# Patient Record
Sex: MALE | Race: WHITE | HISPANIC OR LATINO | Employment: FULL TIME | URBAN - METROPOLITAN AREA
[De-identification: names, ages, dates, MRNs, and addresses within clinical notes are randomized per-mention and may not be internally consistent; named-entity substitution may affect disease eponyms.]

---

## 2018-12-23 ENCOUNTER — OFFICE VISIT (OUTPATIENT)
Dept: URGENT CARE | Facility: CLINIC | Age: 33
End: 2018-12-23
Payer: COMMERCIAL

## 2018-12-23 VITALS
RESPIRATION RATE: 18 BRPM | SYSTOLIC BLOOD PRESSURE: 122 MMHG | TEMPERATURE: 98.7 F | HEART RATE: 93 BPM | DIASTOLIC BLOOD PRESSURE: 84 MMHG | OXYGEN SATURATION: 97 %

## 2018-12-23 DIAGNOSIS — J20.9 ACUTE BRONCHITIS, UNSPECIFIED ORGANISM: Primary | ICD-10-CM

## 2018-12-23 PROCEDURE — 99213 OFFICE O/P EST LOW 20 MIN: CPT | Performed by: PHYSICIAN ASSISTANT

## 2018-12-23 RX ORDER — BENZONATATE 100 MG/1
100 CAPSULE ORAL 3 TIMES DAILY PRN
Qty: 30 CAPSULE | Refills: 0 | Status: SHIPPED | OUTPATIENT
Start: 2018-12-23

## 2018-12-23 RX ORDER — ALBUTEROL SULFATE 90 UG/1
2 AEROSOL, METERED RESPIRATORY (INHALATION) EVERY 6 HOURS PRN
Qty: 1 INHALER | Refills: 0 | Status: SHIPPED | OUTPATIENT
Start: 2018-12-23

## 2018-12-23 RX ORDER — FLUTICASONE PROPIONATE 50 MCG
1 SPRAY, SUSPENSION (ML) NASAL DAILY
Qty: 1 BOTTLE | Refills: 0 | Status: SHIPPED | OUTPATIENT
Start: 2018-12-23

## 2018-12-23 NOTE — PATIENT INSTRUCTIONS
Acute Bronchitis   AMBULATORY CARE:   Acute bronchitis  is swelling and irritation in the air passages of your lungs  This irritation may cause you to cough or have other breathing problems  Acute bronchitis often starts because of another illness, such as a cold or the flu  The illness spreads from your nose and throat to your windpipe and airways  Bronchitis is often called a chest cold  Acute bronchitis lasts about 3 to 6 weeks and is usually not a serious illness  Your cough can last for several weeks  You may have any of the following symptoms:   · A cough with sputum that may be clear, yellow, or green    · Feeling more tired than usual, and body aches    · A fever and chills    · Wheezing when you breathe    · A tight chest or pain when you breathe or cough  Seek care immediately if:   · You cough up blood  · Your lips or fingernails turn blue  · You feel like you are not getting enough air when you breathe  Contact your healthcare provider if:   · You have a fever  · Your breathing problems do not go away or get worse  · Your cough does not get better within 4 weeks  · You have questions or concerns about your condition or care  Self-care:   · Get more rest   Rest helps your body to heal  Slowly start to do more each day  Rest when you feel it is needed  · Avoid irritants in the air  Avoid chemicals, fumes, and dust  Wear a face mask if you must work around dust or fumes  Stay inside on days when air pollution levels are high  If you have allergies, stay inside when pollen counts are high  Do not use aerosol products, such as spray-on deodorant, bug spray, and hair spray  · Do not smoke or be around others who smoke  Nicotine and other chemicals in cigarettes and cigars damages the cilia that move mucus out of your lungs  Ask your healthcare provider for information if you currently smoke and need help to quit  E-cigarettes or smokeless tobacco still contain nicotine   Talk to your healthcare provider before you use these products  · Drink liquids as directed  Liquids help keep your air passages moist and help you cough up mucus  You may need to drink more liquids when you have acute bronchitis  Ask how much liquid to drink each day and which liquids are best for you  · Use a humidifier or vaporizer  Use a cool mist humidifier or a vaporizer to increase air moisture in your home  This may make it easier for you to breathe and help decrease your cough  Prevent acute bronchitis by doing the following:   · Get the vaccinations you need  Ask your healthcare provider if you should get vaccinated against the flu or pneumonia  · Prevent the spread of germs  You can decrease your risk of acute bronchitis and other illnesses by doing the following:     [facility] [place] your hands often with soap and water  Carry germ-killing hand lotion or gel with you  You can use the lotion or gel to clean your hands when soap and water are not available  ¨ Do not touch your eyes, nose, or mouth unless you have washed your hands first     ¨ Always cover your mouth when you cough to prevent the spread of germs  It is best to cough into a tissue or your shirt sleeve instead of into your hand  Ask those around you cover their mouths when they cough  ¨ Try to avoid people who have a cold or the flu  If you are sick, stay away from others as much as possible  Medicines: Your healthcare provider may  give you any of the following:  · Ibuprofen or acetaminophen  are medicines that help lower your fever  They are available without a doctor's order  Ask your healthcare provider which medicine is right for you  Ask how much to take and how often to take it  Follow directions  These medicines can cause stomach bleeding if not taken correctly  Ibuprofen can cause kidney damage  Do not take ibuprofen if you have kidney disease, an ulcer, or allergies to aspirin  Acetaminophen can cause liver damage   Do not take more than 4,000 milligrams in 24 hours  · Decongestants  help loosen mucus in your lungs and make it easier to cough up  This can help you breathe easier  · Cough suppressants  decrease your urge to cough  If your cough produces mucus, do not take a cough suppressant unless your healthcare provider tells you to  Your healthcare provider may suggest that you take a cough suppressant at night so you can rest     · Inhalers  may be given  Your healthcare provider may give you one or more inhalers to help you breathe easier and cough less  An inhaler gives your medicine to open your airways  Ask your healthcare provider to show you how to use your inhaler correctly  Follow up with your healthcare provider as directed:  Write down questions you have so you will remember to ask them during your follow-up visits  © 2017 [employer] Information is for End User's use only and may not be sold, redistributed or otherwise used for commercial purposes  All illustrations and images included in CareNotes® are the copyrighted property of [employer]  or [employer]  The above information is an  only  It is not intended as medical advice for individual conditions or treatments  Talk to your doctor, nurse or pharmacist before following any medical regimen to see if it is safe and effective for you  Acute Bronchitis:   -There is no sign of bacterial infection on exam at this time  This is likely a viral illness  Advised supportive measures  The patients lungs are CTABL although there are decreased breath sounds with transmitted upper airway sounds bilaterally    -Fluticasone Nasal Spray for PND and congestion  Tessalon perles for the cough  Will prescribe albuterol inhaler for chest tightness as prescribed  -You can use OTC zyrtec or claritin  You can OTC mucinex  -Use a humidifier next to your bed  Take steam showers     -You can take Advil or Tylenol for fever or pain  -Stay very well hydrated and rest   -If your symptoms persist or worsen follow up immediately with your PCP

## 2018-12-23 NOTE — PROGRESS NOTES
[facility]        NAME: Agnes Ga is a 35 y o  male  : 1985    MRN: [id number]  DATE: 2018  TIME: 1:38 PM    Assessment and Plan   Acute bronchitis, unspecified organism [J20 9]  1  Acute bronchitis, unspecified organism  benzonatate (TESSALON PERLES) 100 mg capsule    fluticasone (FLONASE) 50 mcg/act nasal spray    albuterol (PROVENTIL HFA,VENTOLIN HFA) 90 mcg/act inhaler         Patient Instructions   Acute Bronchitis:   -There is no sign of bacterial infection on exam at this time  This is likely a viral illness  Advised supportive measures  The patients lungs are CTABL although there are decreased breath sounds with transmitted upper airway sounds bilaterally    -Fluticasone Nasal Spray for PND and congestion  Tessalon perles for the cough  Will prescribe albuterol inhaler for chest tightness as prescribed  -You can use OTC zyrtec or claritin  You can OTC mucinex  -Use a humidifier next to your bed  Take steam showers  -You can take Advil or Tylenol for fever or pain  -Stay very well hydrated and rest   -If your symptoms persist or worsen follow up immediately with your PCP      Follow up with PCP in 3-5 days  Proceed to  ER if symptoms worsen  Chief Complaint     Chief Complaint   Patient presents with    Cold Like Symptoms    Cough         History of Present Illness       The patient presents today for a 3 day hx of nasal congestion and sinus pressure  He states that he has a productive cough of a yellow sputum  He is a  and has been exposed to multiple sick contacts at work  He denies fever, chills  He has been taking mucinex for his symptoms  He denies a hx of asthma, COPD or smoking  Review of Systems   Review of Systems   Constitutional: Negative for activity change, appetite change, chills, diaphoresis, fatigue and fever  HENT: Positive for congestion, postnasal drip, rhinorrhea and sinus pressure   Negative for ear discharge, ear pain, facial swelling, hearing loss, mouth sores, sinus pain, sore throat, tinnitus, trouble swallowing and voice change  Respiratory: Positive for cough and chest tightness  Negative for apnea, shortness of breath, wheezing and stridor  Cardiovascular: Negative for chest pain and palpitations  Gastrointestinal: Negative for diarrhea, nausea and vomiting  Musculoskeletal: Negative for arthralgias, myalgias, neck pain and neck stiffness  Skin: Negative for rash  Allergic/Immunologic: Negative for immunocompromised state  Neurological: Negative for dizziness, weakness, light-headedness, numbness and headaches  Hematological: Negative for adenopathy  Current Medications       Current Outpatient Prescriptions:     albuterol (PROVENTIL HFA,VENTOLIN HFA) 90 mcg/act inhaler, Inhale 2 puffs every 6 (six) hours as needed for wheezing or shortness of breath, Disp: 1 Inhaler, Rfl: 0    benzonatate (TESSALON PERLES) 100 mg capsule, Take 1 capsule (100 mg total) by mouth 3 (three) times a day as needed for cough, Disp: 30 capsule, Rfl: 0    fluticasone (FLONASE) 50 mcg/act nasal spray, 1 spray into each nostril daily, Disp: 1 Bottle, Rfl: 0    Current Allergies     Allergies as of 12/23/2018    (No Known Allergies)            The following portions of the patient's history were reviewed and updated as appropriate: allergies, current medications, past family history, past medical history, past social history, past surgical history and problem list      No past medical history on file  No past surgical history on file  No family history on file  Medications have been verified  Objective   /84   Pulse 93   Temp 98 7 °F (37 1 °C)   Resp 18   SpO2 97%        Physical Exam     Physical Exam   Constitutional: He is oriented to person, place, and time  He appears well-developed and well-nourished  No distress  HENT:   Head: Normocephalic and atraumatic     Right Ear: Hearing, tympanic membrane, external ear and ear canal normal    Left Ear: Hearing, tympanic membrane, external ear and ear canal normal    Nose: Mucosal edema and rhinorrhea present  Right sinus exhibits no maxillary sinus tenderness and no frontal sinus tenderness  Left sinus exhibits no maxillary sinus tenderness and no frontal sinus tenderness  Mouth/Throat: Uvula is midline, oropharynx is clear and moist and mucous membranes are normal  No uvula swelling  No oropharyngeal exudate, posterior oropharyngeal edema, posterior oropharyngeal erythema or tonsillar abscesses  Neck: Normal range of motion  Neck supple  Cardiovascular: Normal rate, regular rhythm, S1 normal, S2 normal and normal heart sounds  Exam reveals no gallop, no S3, no S4, no distant heart sounds and no friction rub  No murmur heard  Pulmonary/Chest: No accessory muscle usage  No tachypnea and no bradypnea  No respiratory distress  He has decreased breath sounds (transmitted upper airway sounds )  He has no wheezes  He has no rhonchi  He has no rales  Lymphadenopathy:     He has no cervical adenopathy  Neurological: He is alert and oriented to person, place, and time  Skin: He is not diaphoretic  Psychiatric: He has a normal mood and affect  His behavior is normal    Nursing note and vitals reviewed

## 2024-06-21 ENCOUNTER — HOSPITAL ENCOUNTER (INPATIENT)
Facility: HOSPITAL | Age: 39
LOS: 4 days | Discharge: HOME | DRG: 392 | End: 2024-06-25
Attending: EMERGENCY MEDICINE | Admitting: SURGERY
Payer: COMMERCIAL

## 2024-06-21 ENCOUNTER — APPOINTMENT (EMERGENCY)
Dept: CT IMAGING | Facility: HOSPITAL | Age: 39
DRG: 392 | End: 2024-06-21
Payer: COMMERCIAL

## 2024-06-21 DIAGNOSIS — K57.92 ACUTE DIVERTICULITIS: Primary | ICD-10-CM

## 2024-06-21 DIAGNOSIS — K76.9 LIVER LESION: ICD-10-CM

## 2024-06-21 DIAGNOSIS — K75.81 STEATOHEPATITIS: ICD-10-CM

## 2024-06-21 PROBLEM — K57.20 DIVERTICULITIS OF LARGE INTESTINE WITH PERFORATION WITHOUT ABSCESS OR BLEEDING: Status: ACTIVE | Noted: 2024-06-21

## 2024-06-21 PROBLEM — A41.9 SEPSIS WITHOUT ACUTE ORGAN DYSFUNCTION (HCC): Status: ACTIVE | Noted: 2024-06-21

## 2024-06-21 LAB
ALBUMIN SERPL BCG-MCNC: 4.6 G/DL (ref 3.5–5)
ALP SERPL-CCNC: 58 U/L (ref 34–104)
ALT SERPL W P-5'-P-CCNC: 42 U/L (ref 7–52)
ANION GAP SERPL CALCULATED.3IONS-SCNC: 11 MMOL/L (ref 4–13)
AST SERPL W P-5'-P-CCNC: 22 U/L (ref 13–39)
BASOPHILS # BLD AUTO: 0.05 THOUSANDS/ÂΜL (ref 0–0.1)
BASOPHILS NFR BLD AUTO: 0 % (ref 0–1)
BILIRUB SERPL-MCNC: 0.88 MG/DL (ref 0.2–1)
BUN SERPL-MCNC: 10 MG/DL (ref 5–25)
CALCIUM SERPL-MCNC: 9.6 MG/DL (ref 8.4–10.2)
CHLORIDE SERPL-SCNC: 102 MMOL/L (ref 96–108)
CO2 SERPL-SCNC: 24 MMOL/L (ref 21–32)
CREAT SERPL-MCNC: 1.01 MG/DL (ref 0.6–1.3)
EOSINOPHIL # BLD AUTO: 0.05 THOUSAND/ÂΜL (ref 0–0.61)
EOSINOPHIL NFR BLD AUTO: 0 % (ref 0–6)
ERYTHROCYTE [DISTWIDTH] IN BLOOD BY AUTOMATED COUNT: 12.1 % (ref 11.6–15.1)
GFR SERPL CREATININE-BSD FRML MDRD: 93 ML/MIN/1.73SQ M
GLUCOSE SERPL-MCNC: 100 MG/DL (ref 65–140)
HCT VFR BLD AUTO: 42 % (ref 36.5–49.3)
HGB BLD-MCNC: 14.2 G/DL (ref 12–17)
IMM GRANULOCYTES # BLD AUTO: 0.13 THOUSAND/UL (ref 0–0.2)
IMM GRANULOCYTES NFR BLD AUTO: 1 % (ref 0–2)
LACTATE SERPL-SCNC: 1.1 MMOL/L (ref 0.5–2)
LIPASE SERPL-CCNC: 15 U/L (ref 11–82)
LYMPHOCYTES # BLD AUTO: 1.99 THOUSANDS/ÂΜL (ref 0.6–4.47)
LYMPHOCYTES NFR BLD AUTO: 10 % (ref 14–44)
MAGNESIUM SERPL-MCNC: 1.7 MG/DL (ref 1.9–2.7)
MCH RBC QN AUTO: 27.6 PG (ref 26.8–34.3)
MCHC RBC AUTO-ENTMCNC: 33.8 G/DL (ref 31.4–37.4)
MCV RBC AUTO: 82 FL (ref 82–98)
MONOCYTES # BLD AUTO: 1.45 THOUSAND/ÂΜL (ref 0.17–1.22)
MONOCYTES NFR BLD AUTO: 7 % (ref 4–12)
NEUTROPHILS # BLD AUTO: 15.99 THOUSANDS/ÂΜL (ref 1.85–7.62)
NEUTS SEG NFR BLD AUTO: 82 % (ref 43–75)
NRBC BLD AUTO-RTO: 0 /100 WBCS
PLATELET # BLD AUTO: 253 THOUSANDS/UL (ref 149–390)
PMV BLD AUTO: 11.6 FL (ref 8.9–12.7)
POTASSIUM SERPL-SCNC: 3.4 MMOL/L (ref 3.5–5.3)
PROCALCITONIN SERPL-MCNC: 0.13 NG/ML
PROT SERPL-MCNC: 7.7 G/DL (ref 6.4–8.4)
RBC # BLD AUTO: 5.14 MILLION/UL (ref 3.88–5.62)
SODIUM SERPL-SCNC: 137 MMOL/L (ref 135–147)
WBC # BLD AUTO: 19.66 THOUSAND/UL (ref 4.31–10.16)

## 2024-06-21 PROCEDURE — 87040 BLOOD CULTURE FOR BACTERIA: CPT

## 2024-06-21 PROCEDURE — 74177 CT ABD & PELVIS W/CONTRAST: CPT

## 2024-06-21 PROCEDURE — 80053 COMPREHEN METABOLIC PANEL: CPT | Performed by: EMERGENCY MEDICINE

## 2024-06-21 PROCEDURE — 84145 PROCALCITONIN (PCT): CPT

## 2024-06-21 PROCEDURE — 83735 ASSAY OF MAGNESIUM: CPT

## 2024-06-21 PROCEDURE — NC001 PR NO CHARGE: Performed by: SURGERY

## 2024-06-21 PROCEDURE — 36415 COLL VENOUS BLD VENIPUNCTURE: CPT

## 2024-06-21 PROCEDURE — 83605 ASSAY OF LACTIC ACID: CPT

## 2024-06-21 PROCEDURE — 96365 THER/PROPH/DIAG IV INF INIT: CPT

## 2024-06-21 PROCEDURE — 99284 EMERGENCY DEPT VISIT MOD MDM: CPT

## 2024-06-21 PROCEDURE — 83690 ASSAY OF LIPASE: CPT | Performed by: EMERGENCY MEDICINE

## 2024-06-21 PROCEDURE — 85025 COMPLETE CBC W/AUTO DIFF WBC: CPT | Performed by: EMERGENCY MEDICINE

## 2024-06-21 PROCEDURE — 99285 EMERGENCY DEPT VISIT HI MDM: CPT

## 2024-06-21 RX ORDER — POTASSIUM CHLORIDE 14.9 MG/ML
20 INJECTION INTRAVENOUS
Status: DISPENSED | OUTPATIENT
Start: 2024-06-21 | End: 2024-06-22

## 2024-06-21 RX ORDER — HYDROMORPHONE HCL/PF 1 MG/ML
0.5 SYRINGE (ML) INJECTION EVERY 4 HOURS PRN
Status: DISCONTINUED | OUTPATIENT
Start: 2024-06-21 | End: 2024-06-25 | Stop reason: HOSPADM

## 2024-06-21 RX ORDER — NICOTINE 21 MG/24HR
1 PATCH, TRANSDERMAL 24 HOURS TRANSDERMAL DAILY PRN
Status: DISCONTINUED | OUTPATIENT
Start: 2024-06-21 | End: 2024-06-25 | Stop reason: HOSPADM

## 2024-06-21 RX ORDER — SODIUM CHLORIDE, SODIUM GLUCONATE, SODIUM ACETATE, POTASSIUM CHLORIDE, MAGNESIUM CHLORIDE, SODIUM PHOSPHATE, DIBASIC, AND POTASSIUM PHOSPHATE .53; .5; .37; .037; .03; .012; .00082 G/100ML; G/100ML; G/100ML; G/100ML; G/100ML; G/100ML; G/100ML
1000 INJECTION, SOLUTION INTRAVENOUS ONCE
Status: COMPLETED | OUTPATIENT
Start: 2024-06-21 | End: 2024-06-22

## 2024-06-21 RX ORDER — ENOXAPARIN SODIUM 100 MG/ML
40 INJECTION SUBCUTANEOUS DAILY
Status: DISCONTINUED | OUTPATIENT
Start: 2024-06-22 | End: 2024-06-25 | Stop reason: HOSPADM

## 2024-06-21 RX ORDER — ACETAMINOPHEN 325 MG/1
975 TABLET ORAL EVERY 8 HOURS SCHEDULED
Status: DISCONTINUED | OUTPATIENT
Start: 2024-06-21 | End: 2024-06-22

## 2024-06-21 RX ORDER — METRONIDAZOLE 500 MG/100ML
500 INJECTION, SOLUTION INTRAVENOUS EVERY 8 HOURS
Status: DISCONTINUED | OUTPATIENT
Start: 2024-06-21 | End: 2024-06-21

## 2024-06-21 RX ORDER — SODIUM CHLORIDE, SODIUM GLUCONATE, SODIUM ACETATE, POTASSIUM CHLORIDE, MAGNESIUM CHLORIDE, SODIUM PHOSPHATE, DIBASIC, AND POTASSIUM PHOSPHATE .53; .5; .37; .037; .03; .012; .00082 G/100ML; G/100ML; G/100ML; G/100ML; G/100ML; G/100ML; G/100ML
100 INJECTION, SOLUTION INTRAVENOUS CONTINUOUS
Status: DISCONTINUED | OUTPATIENT
Start: 2024-06-21 | End: 2024-06-25

## 2024-06-21 RX ORDER — SODIUM CHLORIDE, SODIUM GLUCONATE, SODIUM ACETATE, POTASSIUM CHLORIDE, MAGNESIUM CHLORIDE, SODIUM PHOSPHATE, DIBASIC, AND POTASSIUM PHOSPHATE .53; .5; .37; .037; .03; .012; .00082 G/100ML; G/100ML; G/100ML; G/100ML; G/100ML; G/100ML; G/100ML
1000 INJECTION, SOLUTION INTRAVENOUS ONCE
Status: COMPLETED | OUTPATIENT
Start: 2024-06-21 | End: 2024-06-21

## 2024-06-21 RX ORDER — ACETAMINOPHEN 325 MG/1
975 TABLET ORAL EVERY 8 HOURS SCHEDULED
Status: DISCONTINUED | OUTPATIENT
Start: 2024-06-21 | End: 2024-06-21

## 2024-06-21 RX ORDER — MAGNESIUM SULFATE HEPTAHYDRATE 40 MG/ML
2 INJECTION, SOLUTION INTRAVENOUS ONCE
Status: COMPLETED | OUTPATIENT
Start: 2024-06-21 | End: 2024-06-22

## 2024-06-21 RX ORDER — ONDANSETRON 2 MG/ML
4 INJECTION INTRAMUSCULAR; INTRAVENOUS EVERY 6 HOURS PRN
Status: DISCONTINUED | OUTPATIENT
Start: 2024-06-21 | End: 2024-06-25 | Stop reason: HOSPADM

## 2024-06-21 RX ORDER — OXYCODONE HYDROCHLORIDE 5 MG/1
5 TABLET ORAL EVERY 4 HOURS PRN
Status: DISCONTINUED | OUTPATIENT
Start: 2024-06-21 | End: 2024-06-25 | Stop reason: HOSPADM

## 2024-06-21 RX ADMIN — IOHEXOL 100 ML: 350 INJECTION, SOLUTION INTRAVENOUS at 15:20

## 2024-06-21 RX ADMIN — CEFTRIAXONE SODIUM 2000 MG: 10 INJECTION, POWDER, FOR SOLUTION INTRAVENOUS at 17:18

## 2024-06-21 RX ADMIN — ACETAMINOPHEN 975 MG: 325 TABLET, FILM COATED ORAL at 22:14

## 2024-06-21 RX ADMIN — METRONIDAZOLE 500 MG: 500 INJECTION, SOLUTION INTRAVENOUS at 18:31

## 2024-06-21 RX ADMIN — SODIUM CHLORIDE, SODIUM GLUCONATE, SODIUM ACETATE, POTASSIUM CHLORIDE, MAGNESIUM CHLORIDE, SODIUM PHOSPHATE, DIBASIC, AND POTASSIUM PHOSPHATE 1000 ML: .53; .5; .37; .037; .03; .012; .00082 INJECTION, SOLUTION INTRAVENOUS at 22:14

## 2024-06-21 RX ADMIN — SODIUM CHLORIDE, SODIUM GLUCONATE, SODIUM ACETATE, POTASSIUM CHLORIDE, MAGNESIUM CHLORIDE, SODIUM PHOSPHATE, DIBASIC, AND POTASSIUM PHOSPHATE 1000 ML: .53; .5; .37; .037; .03; .012; .00082 INJECTION, SOLUTION INTRAVENOUS at 20:45

## 2024-06-21 NOTE — DISCHARGE INSTR - AVS FIRST PAGE
Findin cm hyperdense lesion at the hepatic dome              Follow up required: contrast-enhanced MRI,              Follow up should be done within 1 -2 month(s)     Please inform your family physician at follow-up.    ^^^^^^^^^^^^^^^^^^^^^^^^^^^^^^^^^^^^^^^^^^^^^^^^^^^^^^^^^^^^^^^^^^^^^^^^^^^^^^^^^^^^^^^^^^^^^^^^^^^^^^^^^^^^^^^^^^^^^^^^^^^^^^^^^^^^^^^^^^^^^^^^^^^^^^^^^^^^    Continue full liquids (including anything that can be put in a ) until you have finished your full course of antibiotics. You may also have toast and crackers.     After this, continue a low fiber diet for 3 weeks. (No salads, raw veggies or tough  meats i.e. steak, pork chops).  In addition add a cap full of Miralax to your diet daily with at least 3 full glass of water or juice.       Then you should transition to a high fiber diet to help prevent diverticulitis episodes in the future. Dedicated Bran cereal or Metamucil plus 3 glasses of water daily.  (and you can stop Miralax).    Diverticulitis   WHAT YOU NEED TO KNOW:   Diverticulitis is a condition that causes small pockets along your intestine called diverticula to become inflamed or infected. This is caused by hard bowel movements, food, or bacteria that get stuck in the pockets.       DISCHARGE INSTRUCTIONS:   Return to the emergency department if:   You have bowel movement or foul-smelling discharge leaking from your vagina or in your urine.    You have severe diarrhea.    You urinate less than usual or not at all.    You are not able to have a bowel movement.    You cannot stop vomiting.    You have severe abdominal pain, a fever, and your abdomen is larger than usual.    You have new or increased blood in your bowel movements.    Call your doctor if:   You have pain when you urinate.    Your symptoms get worse or do not go away.    You have questions or concerns about your condition or care.    Medicines:   Antibiotics  may be given to help treat a bacterial  infection.    Prescription pain medicine  may be given. Ask your healthcare provider how to take this medicine safely. Some prescription pain medicines contain acetaminophen. Do not take other medicines that contain acetaminophen without talking to your healthcare provider. Too much acetaminophen may cause liver damage. Prescription pain medicine may cause constipation. Ask your healthcare provider how to prevent or treat constipation.     Take your medicine as directed.  Contact your healthcare provider if you think your medicine is not helping or if you have side effects. Tell him or her if you are allergic to any medicine. Keep a list of the medicines, vitamins, and herbs you take. Include the amounts, and when and why you take them. Bring the list or the pill bottles to follow-up visits. Carry your medicine list with you in case of an emergency.    Clear liquid diet:  A clear liquid diet includes any liquids that you can see through. Examples include water, ginger-key, cranberry or apple juice, frozen fruit ice, or broth. Stay on a clear liquid diet until your symptoms are gone, or as directed.    Follow up with your doctor as directed:  You may need to return for a colonoscopy. When your symptoms are gone, you may need a low-fat, high-fiber diet to prevent diverticulitis from developing again. Your healthcare provider or dietitian can help you create meal plans. Write down your questions so you remember to ask them during your visits.    © Copyright [employer] 2022 Information is for End User's use only and may not be sold, redistributed or otherwise used for commercial purposes. All illustrations and images included in CareNotes® are the copyrighted property of [employer] or [employer]  The above information is an  only. It is not intended as medical advice for individual conditions or treatments. Talk to your doctor, nurse or pharmacist before following any medical regimen to  see if it is safe and effective for you.    Full Liquid Diet   A full liquid diet  includes only liquids and foods that are liquid at room temperature. You may need to follow this diet if you have trouble chewing or swallowing. You may also need to follow this diet if you have a severe intestinal illness or had surgery on your intestine. Your healthcare provider will tell you how long to follow this diet and when to start solid foods.   Liquids and foods to include:   Fruit juices or pureed fruit without seeds    Vegetable juices or pureed vegetables in broth     Broth or strained cream soups    Refined and strained cooked cereals thinned with milk or half-and-half creamer    Flavored gelatin without chunks of fruit    Plain ice cream, milk shakes, sherbet, or popsicles    Yogurt, pudding, or soft custard    Milk or liquid nutrition supplements    Coffee, tea, sodas, water, or sports drinks    Butter, margarine, or cream      Low Fiber Diet   WHAT YOU NEED TO KNOW:   A low-fiber diet limits foods that are high in fiber. Fiber is the part of fruits, vegetables, and grains that is not broken down by your body. You may need to follow this diet after surgery on your intestines. You may also need to follow this diet for certain conditions such as Crohn disease or ulcerative colitis. Ask your healthcare provider or dietitian how much fiber you can have each day.  DISCHARGE INSTRUCTIONS:   Foods to include:  Read food labels to check the amount of fiber that are found in foods. Some foods that are low in fiber include the following:  Grains:  Choose grains that have less than 2 grams of fiber in each serving. Examples include the following:    Cream of wheat and finely ground grits    Dry cereal made from rice    White bread, white pasta, and white rice    Crackers, bagels, and rolls made from white or refined flour    Other foods:      Canned and well-cooked fruit without skins or seeds, and juice without pulp    Ripe  bananas and melons    Canned and well-cooked vegetables without skins or seeds, and vegetable juice    Cow's milk, lactose-free milk, soy milk, and rice milk    Yogurt without nuts, fruit, or granola    Eggs, poultry (such as chicken and turkey), fish, and tender, ground, well-cooked beef    Tofu and smooth peanut butter    Broth and strained soups made of low-fiber foods    Foods to avoid:   Breads, cereals, crackers, and pasta made with whole wheat or whole grains (such as whole oats)    Brown rice, wild rice, quinoa, kasha, and barley    All fresh fruit with skin, except banana and melons    Dried fruits and fruit juice with pulp    Canned pineapple    Raw vegetables    Nuts, seeds, and popcorn    Beans, nuts, peas, and lentils    Tough meats    Coconut and avocado    High Fiber Diet   WHAT YOU NEED TO KNOW:   A high-fiber diet includes foods that have a high amount of fiber. Fiber is the part of fruits, vegetables, and grains that is not broken down by your body. Fiber keeps your bowel movements regular. Fiber can also help lower your cholesterol level, control blood sugar in people with diabetes, and relieve constipation. Fiber can also help you control your weight because it helps you feel full faster. Most adults should eat 25 to 35 grams of fiber each day. Talk to your dietitian or healthcare provider about the amount of fiber you need.  DISCHARGE INSTRUCTIONS:   Good sources of fiber:    Foods with at least 4 grams of fiber per serving:      ? to ½ cup of high-fiber cereal (check the nutrition label on the box)    ½ cup of blackberries or raspberries    4 dried prunes    1 cooked artichoke    ½ cup of cooked legumes, such as lentils, or red, kidney, and gibbs beans    Foods with 1 to 3 grams of fiber per servin slice of whole-wheat, pumpernickel, or rye bread    ½ cup of cooked brown rice    4 whole-wheat crackers    1 cup of oatmeal    ½ cup of cereal with 1 to 3 grams of fiber per serving (check  the nutrition label on the box)    1 small piece of fruit, such as an apple, banana, pear, kiwi, or orange    3 dates    ½ cup of canned apricots, fruit cocktail, peaches, or pears    ½ cup of raw or cooked vegetables, such as carrots, cauliflower, cabbage, spinach, squash, or corn    Ways that you can increase fiber in your diet:   Choose brown or wild rice instead of white rice.     Use whole wheat flour in recipes instead of white or all-purpose flour.     Add beans and peas to casseroles or soups.     Choose fresh fruit and vegetables with peels or skins on instead of juices.    Other diet guidelines to follow:   Add fiber to your diet slowly.  You may have abdominal discomfort, bloating, and gas if you add fiber to your diet too quickly.     Drink plenty of liquids as you add fiber to your diet.  You may have nausea or develop constipation if you do not drink enough water. Ask how much liquid to drink each day and which liquids are best for you.

## 2024-06-21 NOTE — H&P
H&P- General Surgery  : [facility] Surgery Resident role on [facility]  Grant Shannon 39 y.o. male MRN: [id number]  Unit/Bed#: ED-34 Encounter: [id number]        Assessment:  39 y.o. male with acute diverticulitis with possible small contained perforation. Afebrile, tachycardia, leukocytosis.        Plan:  - Admit  - NPO  - IVF  - 1L Bolus of plasmalyte  - Rocephin and flagyl  - Trend WBC and fever curve  - DVT ppx with lovenox  - PRN analgesia and antiemetics  - Patient will require a colonoscopy in approximately 6 weeks. Outpatient MRI with contrast for evaluation of liver lesion.        HPI:  Grant Shannon is a 39 y.o. male with no significant past medical or surgical history who presents with one day of abdominal pain with associated fevers. Denies nausea or vomiting. Denies prior similar episodes. He says the pain started this morning and continued to worsen throughout the day. Passing flatus and having bowel movements. No prior history of diverticulitis or IBD. No know family history of colon cancer, IBD, diverticulitis. Not on AC/AP. No prior colonoscopies. CT AP with sigmoid diverticulitis. Possible small contained perforation. Hepatomegaly with steatosis.1 cm hyperdense lesion at the hepatic dome. Labs notable for a leukocytosis to 19. On exam, his abdomen is soft, obese, tender in the alex umbilical region, no rebound or guarding.     Physical Exam:  General: No acute distress, alert and oriented, obese  CV: Well perfused, regular rate and rhythm  Lungs: Normal work of breathing, no increased respiratory effort  Abdomen: Soft, tender in right mid abdomen, no rebound or guarding  Extremities: No edema, clubbing or cyanosis  Skin: Warm, dry        Review of Systems   Review of systems negative except as per HPI    Objective       No intake or output data in the 24 hours ending 06/21/24 1750    First Vitals:   Blood Pressure: 132/80 (06/21/24 1358)  Pulse: (!) 115 (06/21/24 1358)  Temperature:  "99.9 °F (37.7 °C) (06/21/24 1358)  Temp Source: Oral (06/21/24 1358)  Respirations: 20 (06/21/24 1358)  Height: 5' 8\" (172.7 cm) (06/21/24 1358)  Weight - Scale: 114 kg (251 lb 15.8 oz) (06/21/24 1358)  SpO2: 98 % (06/21/24 1358)    Current Vitals:   Blood Pressure: 140/74 (06/21/24 1720)  Pulse: (!) 111 (06/21/24 1720)  Temperature: 99.9 °F (37.7 °C) (06/21/24 1358)  Temp Source: Oral (06/21/24 1358)  Respirations: 20 (06/21/24 1358)  Height: 5' 8\" (172.7 cm) (06/21/24 1358)  Weight - Scale: 114 kg (251 lb 15.8 oz) (06/21/24 1358)  SpO2: 96 % (06/21/24 1720)    Invasive Devices       Peripheral Intravenous Line  Duration             Peripheral IV 06/21/24 Left;Ventral (anterior) Forearm <1 day    Peripheral IV 06/21/24 Right Antecubital <1 day                    Imaging: I have personally reviewed pertinent reports.      CT abdomen pelvis with contrast    Result Date: 6/21/2024  Impression: Sigmoid diverticulitis. No obvious extraluminal free air. No adjacent fluid collection. However the involved diverticula contains gas that extends slightly beyond the expected margin of the diverticula. See key images. Therefore a small contained perforation is not excluded. Hepatomegaly with steatosis. 1 cm hyperdense lesion at the hepatic dome is not completely evaluated on this single phase exam. No priors for comparison Per chart review, in the setting of nonalcoholic steatohepatitis, contrast-enhanced MRI is recommended for further evaluation. Findings were discussed with Malena [place] at 4:45 p.m. on 6/21/2024 Workstation performed: [id number]       EKG, Pathology, and Other Studies: I have personally reviewed pertinent reports.        Historical Information   History reviewed. No pertinent past medical history.  History reviewed. No pertinent surgical history.  Social History   Social History     Substance and Sexual Activity   Alcohol Use Never     Social History     Substance and Sexual Activity   Drug Use Never "     Social History     Tobacco Use   Smoking Status Never   Smokeless Tobacco Current     History reviewed. No pertinent family history.    Meds/Allergies   all current active meds have been reviewed, current meds:   Current Facility-Administered Medications   Medication Dose Route Frequency    acetaminophen (TYLENOL) tablet 975 mg  975 mg Oral Q8H MAXIMILIANO    ceftriaxone (ROCEPHIN) 2 g/50 mL in dextrose IVPB  2,000 mg Intravenous Q24H    [START ON 2024] enoxaparin (LOVENOX) subcutaneous injection 40 mg  40 mg Subcutaneous Daily    HYDROmorphone (DILAUDID) injection 0.5 mg  0.5 mg Intravenous Q4H PRN    metroNIDAZOLE (FLAGYL) IVPB (premix) 500 mg 100 mL  500 mg Intravenous Q8H    multi-electrolyte (ISOLYTE-S PH 7.4) bolus 1,000 mL  1,000 mL Intravenous Once    multi-electrolyte (PLASMALYTE-A/ISOLYTE-S PH 7.4) IV solution  125 mL/hr Intravenous Continuous    nicotine (NICODERM CQ) 14 mg/24hr TD 24 hr patch 1 patch  1 patch Transdermal Daily PRN    ondansetron (ZOFRAN) injection 4 mg  4 mg Intravenous Q6H PRN    oxyCODONE (ROXICODONE) IR tablet 5 mg  5 mg Oral Q4H PRN    oxyCODONE (ROXICODONE) split tablet 2.5 mg  2.5 mg Oral Q4H PRN    and PTA meds:   Prior to Admission Medications   Prescriptions Last Dose Informant Patient Reported? Taking?   albuterol (PROVENTIL HFA,VENTOLIN HFA) 90 mcg/act inhaler   No No   Sig: Inhale 2 puffs every 6 (six) hours as needed for wheezing or shortness of breath   benzonatate (TESSALON PERLES) 100 mg capsule   No No   Sig: Take 1 capsule (100 mg total) by mouth 3 (three) times a day as needed for cough   fluticasone (FLONASE) 50 mcg/act nasal spray   No No   Si spray into each nostril daily      Facility-Administered Medications: None     Allergies   Allergen Reactions    Other Sneezing     Cats       Lab Results: I have personally reviewed pertinent lab results.  , CBC:   Lab Results   Component Value Date    WBC 19.66 (H) 2024    HGB 14.2 2024    HCT 42.0 2024     MCV 82 06/21/2024     06/21/2024    RBC 5.14 06/21/2024    MCH 27.6 06/21/2024    MCHC 33.8 06/21/2024    RDW 12.1 06/21/2024    MPV 11.6 06/21/2024    NRBC 0 06/21/2024   , CMP:   Lab Results   Component Value Date    SODIUM 137 06/21/2024    K 3.4 (L) 06/21/2024     06/21/2024    CO2 24 06/21/2024    BUN 10 06/21/2024    CREATININE 1.01 06/21/2024    CALCIUM 9.6 06/21/2024    AST 22 06/21/2024    ALT 42 06/21/2024    ALKPHOS 58 06/21/2024    EGFR 93 06/21/2024       Counseling / Coordination of Care  Total floor / unit time spent today 25 minutes.  Greater than 50% of total time was spent with the patient and / or family counseling and / or coordination of care.    Consult to Surgery - General  Consult performed by: Brice Dowell MD  Consult ordered by: ASPEN Lara MD  General Surgery   06/21/24

## 2024-06-21 NOTE — LETTER
[facility] 4TH FLOOR MED SURG UNIT  [address] [facility]  [place] [address]  Dept: [phone]    June 25, 2024     Patient: Grant Shannon   YOB: 1985   Date of Visit: 6/21/2024       To Whom it May Concern:    Grant Shannon was seen in the hospital from 6/21/2024 to 06/25/24. He may return to work on 7/2 without limitations.    If you have any questions or concerns, please don't hesitate to call.         Sincerely,        Dr. Lee   The Office of Dr. Brijesh Castorena  [facility]

## 2024-06-21 NOTE — SEPSIS NOTE
Sepsis Note   Grant Shannon 39 y.o. male MRN: [id number]  Unit/Bed#: ED-34 Encounter: [id number]       Initial Sepsis Screening       Row Name 06/21/24 1435                Is the patient's history suggestive of a new or worsening infection? Yes (Proceed)  -AB        Suspected source of infection acute abdominal infection  -AB        Indicate SIRS criteria Tachycardia > 90 bpm;Leukocytosis (WBC > 24991 IJL) OR Leukopenia (WBC <4000 IJL) OR Bandemia (WBC >10% bands)  -AB        Are two or more of the above signs & symptoms of infection both present and new to the patient? Yes (Proceed)  -AB        Assess for evidence of organ dysfunction: Are any of the below criteria present within 6 hours of suspected infection and SIRS criteria that are NOT considered to be chronic conditions? --                  User Key  (r) = Recorded By, (t) = Taken By, (c) = Cosigned By      Initials Name Provider Type    AB Malean Larsen PA-C Physician Assistant                        Body mass index is 38.31 kg/m².  Wt Readings from Last 1 Encounters:   06/21/24 114 kg (251 lb 15.8 oz)     IBW (Ideal Body Weight): 68.4 kg    Ideal body weight: 68.4 kg (150 lb 12.7 oz)  Adjusted ideal body weight: 86.8 kg (191 lb 4.3 oz)

## 2024-06-21 NOTE — ED PROVIDER NOTES
History  Chief Complaint   Patient presents with    Abdominal Pain     Pt reports he is concerned for appendicitis. Today he woke up with mid abd pain that radiates to R side. Pt endorses chills. Denies nausea/vomiting.      Grant is a 39 year old male with no pertinent PMHx presenting to the ED for right sided abdominal pain x 0300 this morning (eleven hours ago). The pain woke him up from his sleep with a sensation of needing to have a bowel movement, but when he tried he was unable to pass anything. His last normal bowel movement was yesterday but more constipation like. Motrin taken at 0830 this morning.        Prior to Admission Medications   Prescriptions Last Dose Informant Patient Reported? Taking?   albuterol (PROVENTIL HFA,VENTOLIN HFA) 90 mcg/act inhaler   No No   Sig: Inhale 2 puffs every 6 (six) hours as needed for wheezing or shortness of breath   benzonatate (TESSALON PERLES) 100 mg capsule   No No   Sig: Take 1 capsule (100 mg total) by mouth 3 (three) times a day as needed for cough   fluticasone (FLONASE) 50 mcg/act nasal spray   No No   Si spray into each nostril daily      Facility-Administered Medications: None       History reviewed. No pertinent past medical history.    History reviewed. No pertinent surgical history.    History reviewed. No pertinent family history.  I have reviewed and agree with the history as documented.    E-Cigarette/Vaping     E-Cigarette/Vaping Substances     Social History     Tobacco Use    Smoking status: Never    Smokeless tobacco: Current   Substance Use Topics    Alcohol use: Never    Drug use: Never       Review of Systems   Constitutional:  Positive for chills. Negative for fever.   Respiratory:  Negative for cough and shortness of breath.    Cardiovascular:  Negative for chest pain, palpitations and leg swelling.   Gastrointestinal:  Positive for abdominal pain. Negative for diarrhea, nausea and vomiting.   Musculoskeletal:  Negative for myalgias, neck  pain and neck stiffness.   Skin:  Negative for rash.   Neurological:  Negative for light-headedness and headaches.       Physical Exam  Physical Exam  Vitals reviewed.   Constitutional:       General: He is not in acute distress.     Appearance: Normal appearance. He is ill-appearing (appears to be uncomfortable). He is not toxic-appearing or diaphoretic.   HENT:      Head: Normocephalic and atraumatic.      Right Ear: External ear normal.      Left Ear: External ear normal.      Nose: Nose normal.   Eyes:      General: No scleral icterus.        Right eye: No discharge.         Left eye: No discharge.      Extraocular Movements: Extraocular movements intact.      Conjunctiva/sclera: Conjunctivae normal.   Cardiovascular:      Rate and Rhythm: Normal rate and regular rhythm.      Pulses: Normal pulses.      Heart sounds: Normal heart sounds.   Pulmonary:      Effort: Pulmonary effort is normal. No respiratory distress.      Breath sounds: Normal breath sounds.   Abdominal:      General: There is distension.      Tenderness: There is abdominal tenderness in the right lower quadrant and periumbilical area. There is guarding. Positive signs include McBurney's sign.      Comments: Pain out of proportion to light palpation in the right lower quadrant   Musculoskeletal:         General: Normal range of motion.      Cervical back: Normal range of motion and neck supple. No rigidity or tenderness.   Lymphadenopathy:      Cervical: No cervical adenopathy.   Skin:     General: Skin is warm and dry.      Capillary Refill: Capillary refill takes less than 2 seconds.   Neurological:      General: No focal deficit present.      Mental Status: He is alert.   Psychiatric:         Mood and Affect: Mood normal.         Behavior: Behavior normal.         Vital Signs  ED Triage Vitals [06/21/24 1358]   Temperature Pulse Respirations Blood Pressure SpO2   99.9 °F (37.7 °C) (!) 115 20 132/80 98 %      Temp Source Heart Rate Source  Patient Position - Orthostatic VS BP Location FiO2 (%)   Oral Monitor -- Right arm --      Pain Score       --           Vitals:    06/21/24 1358 06/21/24 1500 06/21/24 1720   BP: 132/80 114/56 140/74   Pulse: (!) 115 (!) 110 (!) 111         Visual Acuity      ED Medications  Medications   ceftriaxone (ROCEPHIN) 2 g/50 mL in dextrose IVPB (2,000 mg Intravenous New Bag 6/21/24 1718)   metroNIDAZOLE (FLAGYL) IVPB (premix) 500 mg 100 mL (has no administration in time range)   multi-electrolyte (PLASMALYTE-A/ISOLYTE-S PH 7.4) IV solution (has no administration in time range)   nicotine (NICODERM CQ) 14 mg/24hr TD 24 hr patch 1 patch (has no administration in time range)   ondansetron (ZOFRAN) injection 4 mg (has no administration in time range)   enoxaparin (LOVENOX) subcutaneous injection 40 mg (has no administration in time range)   acetaminophen (TYLENOL) tablet 975 mg (has no administration in time range)   oxyCODONE (ROXICODONE) split tablet 2.5 mg (has no administration in time range)   oxyCODONE (ROXICODONE) IR tablet 5 mg (has no administration in time range)   HYDROmorphone (DILAUDID) injection 0.5 mg (has no administration in time range)   multi-electrolyte (ISOLYTE-S PH 7.4) bolus 1,000 mL (has no administration in time range)   iohexol (OMNIPAQUE) 350 MG/ML injection (MULTI-DOSE) 100 mL (100 mL Intravenous Given 6/21/24 1520)       Diagnostic Studies  Results Reviewed       Procedure Component Value Units Date/Time    Blood culture #2 [[id number]] Collected: 06/21/24 1630    Lab Status: In process Specimen: Blood from Arm, Left Updated: 06/21/24 1633    Procalcitonin [[id number]]  (Normal) Collected: 06/21/24 1459    Lab Status: Final result Specimen: Blood from Arm, Right Updated: 06/21/24 1540     Procalcitonin 0.13 ng/ml     Lactic acid, plasma (w/reflex if result > 2.0) [[id number]]  (Normal) Collected: 06/21/24 1459    Lab Status: Final result Specimen: Blood from Arm, Right Updated: 06/21/24 2291      LACTIC ACID 1.1 mmol/L     Narrative:      Result may be elevated if tourniquet was used during collection.    Blood culture #1 [[id number]] Collected: 06/21/24 1459    Lab Status: In process Specimen: Blood from Arm, Right Updated: 06/21/24 1504    Comprehensive metabolic panel [[id number]]  (Abnormal) Collected: 06/21/24 1406    Lab Status: Final result Specimen: Blood from Arm, Right Updated: 06/21/24 1441     Sodium 137 mmol/L      Potassium 3.4 mmol/L      Chloride 102 mmol/L      CO2 24 mmol/L      ANION GAP 11 mmol/L      BUN 10 mg/dL      Creatinine 1.01 mg/dL      Glucose 100 mg/dL      Calcium 9.6 mg/dL      AST 22 U/L      ALT 42 U/L      Alkaline Phosphatase 58 U/L      Total Protein 7.7 g/dL      Albumin 4.6 g/dL      Total Bilirubin 0.88 mg/dL      eGFR 93 ml/min/1.73sq m     Narrative:      National Kid[employer] Foundation guidelines for Chronic Kidney Disease (CKD):     Stage 1 with normal or high GFR (GFR > 90 mL/min/1.73 square meters)    Stage 2 Mild CKD (GFR = 60-89 mL/min/1.73 square meters)    Stage 3A Moderate CKD (GFR = 45-59 mL/min/1.73 square meters)    Stage 3B Moderate CKD (GFR = 30-44 mL/min/1.73 square meters)    Stage 4 Severe CKD (GFR = 15-29 mL/min/1.73 square meters)    Stage 5 End Stage CKD (GFR <15 mL/min/1.73 square meters)  Note: GFR calculation is accurate only with a steady state creatinine    Lipase [[id number]]  (Normal) Collected: 06/21/24 1406    Lab Status: Final result Specimen: Blood from Arm, Right Updated: 06/21/24 1441     Lipase 15 u/L     CBC and differential [[id number]]  (Abnormal) Collected: 06/21/24 1406    Lab Status: Final result Specimen: Blood from Arm, Right Updated: 06/21/24 1418     WBC 19.66 Thousand/uL      RBC 5.14 Million/uL      Hemoglobin 14.2 g/dL      Hematocrit 42.0 %      MCV 82 fL      MCH 27.6 pg      MCHC 33.8 g/dL      RDW 12.1 %      MPV 11.6 fL      Platelets 253 Thousands/uL      nRBC 0 /100 WBCs      Segmented % 82 %      Immature Grans %  1 %      Lymphocytes % 10 %      Monocytes % 7 %      Eosinophils Relative 0 %      Basophils Relative 0 %      Absolute Neutrophils 15.99 Thousands/µL      Absolute Immature Grans 0.13 Thousand/uL      Absolute Lymphocytes 1.99 Thousands/µL      Absolute Monocytes 1.45 Thousand/µL      Eosinophils Absolute 0.05 Thousand/µL      Basophils Absolute 0.05 Thousands/µL                    CT abdomen pelvis with contrast   Final Result by Darrian Berrios MD (06/21 1647)      Sigmoid diverticulitis.   No obvious extraluminal free air. No adjacent fluid collection.   However the involved diverticula contains gas that extends slightly beyond the expected margin of the diverticula. See key images.   Therefore a small contained perforation is not excluded.      Hepatomegaly with steatosis.   1 cm hyperdense lesion at the hepatic dome is not completely evaluated on this single phase exam. No priors for comparison   Per chart review, in the setting of nonalcoholic steatohepatitis, contrast-enhanced MRI is recommended for further evaluation.      Findings were discussed with Malena BLACKWOOD at 4:45 p.m. on 6/21/2024         Workstation performed: [id number]                    Procedures  Procedures         ED Course  ED Course as of 06/21/24 1828   Fri Jun 21, 2024   1431 WBC(!): 19.66   1441 Potassium(!): 3.4   1533 LACTIC ACID: 1.1   1542 Procalcitonin: 0.13   1652 CT abdomen pelvis with contrast  IMPRESSION:     Sigmoid diverticulitis.  No obvious extraluminal free air. No adjacent fluid collection.  However the involved diverticula contains gas that extends slightly beyond the expected margin of the diverticula. See key images.  Therefore a small contained perforation is not excluded.     Hepatomegaly with steatosis.  1 cm hyperdense lesion at the hepatic dome is not completely evaluated on this single phase exam. No priors for comparison  Per chart review, in the setting of nonalcoholic steatohepatitis,  contrast-enhanced MRI is recommended for further evaluation.   1700 Message sent to general surgery.                            Initial Sepsis Screening       Row Name 06/21/24 1435                Is the patient's history suggestive of a new or worsening infection? Yes (Proceed)  -AB        Suspected source of infection acute abdominal infection  -AB        Indicate SIRS criteria Tachycardia > 90 bpm;Leukocytosis (WBC > 95754 IJL) OR Leukopenia (WBC <4000 IJL) OR Bandemia (WBC >10% bands)  -AB        Are two or more of the above signs & symptoms of infection both present and new to the patient? Yes (Proceed)  -AB        Assess for evidence of organ dysfunction: Are any of the below criteria present within 6 hours of suspected infection and SIRS criteria that are NOT considered to be chronic conditions? --                  User Key  (r) = Recorded By, (t) = Taken By, (c) = Cosigned By      Initials Name Provider Type    AB Malena Larsen PA-C Physician Assistant                   Initial Sepsis Screening       Row Name 06/21/24 1435                Is the patient's history suggestive of a new or worsening infection? Yes (Proceed)  -AB        Suspected source of infection acute abdominal infection  -AB        Indicate SIRS criteria Tachycardia > 90 bpm;Leukocytosis (WBC > 01553 IJL) OR Leukopenia (WBC <4000 IJL) OR Bandemia (WBC >10% bands)  -AB        Are two or more of the above signs & symptoms of infection both present and new to the patient? Yes (Proceed)  -AB        Assess for evidence of organ dysfunction: Are any of the below criteria present within 6 hours of suspected infection and SIRS criteria that are NOT considered to be chronic conditions? --                  User Key  (r) = Recorded By, (t) = Taken By, (c) = Cosigned By      Initials Name Provider Type    AB Malena Larsen PA-C Physician Assistant                    SBWADE 22yo+      Flowsheet Row Most Recent Value   Initial Alcohol  Screen: US AUDIT-C     1. How often do you have a drink containing alcohol? 0 Filed at: 06/21/2024 1730   2. How many drinks containing alcohol do you have on a typical day you are drinking?  0 Filed at: 06/21/2024 1730   3a. Male UNDER 65: How often do you have five or more drinks on one occasion? 0 Filed at: 06/21/2024 1730   Audit-C Score 0 Filed at: 06/21/2024 1730   ANGELES: How many times in the past year have you...    Used an illegal drug or used a prescription medication for non-medical reasons? Never Filed at: 06/21/2024 1730                      Medical Decision Making  Differential diagnosis to include but not limited to: appendicitis, diverticulitis, bowel obstruction, bowel perforation    Plan: CBC already returned with leukocytosis of 19. CMP and lipase pending. Will also add lactic acid, procalcitonin, and two blood cultures. CT abdomen/pelvis.    Results: negative lactic and pro calcitonin. Cmp unremarkable for complaint. Lipase unremarkable. CT returned with Sigmoid diverticulitis. No obvious extraluminal free air. No adjacent fluid collection. However the involved diverticula contains gas that extends slightly beyond the expected margin of the diverticula. See key images. Therefore a small contained perforation is not excluded. Hepatomegaly with steatosis. 1 cm hyperdense lesion at the hepatic dome is not completely evaluated on this single phase exam. No priors for comparison. Per chart review, in the setting of nonalcoholic steatohepatitis, contrast-enhanced MRI is recommended for further evaluation.    Updated patient regarding the results. Made him aware of the liver lesion and discussed alcohol use. Notes about 17 years ago he was a heavy alcohol drinker, but no longer drinks alcohol. Plan to discuss case with general surgery due to possible perforation. General surgery evaluated the patient, initiated IV ABX, and agreed for admission. Pt stable at time of admission, vital signs reviewed,  questions answered.     Problems Addressed:  Acute diverticulitis: acute illness or injury  Liver lesion: undiagnosed new problem with uncertain prognosis  Steatohepatitis: undiagnosed new problem with uncertain prognosis    Amount and/or Complexity of Data Reviewed  Labs: ordered. Decision-making details documented in ED Course.  Radiology: ordered. Decision-making details documented in ED Course.  Discussion of management or test interpretation with external provider(s): Discussed case with general surgery resident who evaluated the patient and accepted for admission.    Risk  Prescription drug management.  Decision regarding hospitalization.             Disposition  Final diagnoses:   Acute diverticulitis   Steatohepatitis   Liver lesion - 1 cm     Time reflects when diagnosis was documented in both MDM as applicable and the Disposition within this note       Time User Action Codes Description Comment    2024  5:24 PM Malena Larsen [K57.92] Acute diverticulitis     2024  5:25 PM Malena Larsen [K75.81] Steatohepatitis     2024  5:25 PM Malena Larsen Add [K76.9] Liver lesion     2024  5:25 PM Malena Larsen Modify [K76.9] Liver lesion 1 cm          ED Disposition       ED Disposition   Admit    Condition   Stable    Date/Time    5671    Comment   Case was discussed with general surgery and the patient's admission status was agreed to be Admission Status: inpatient status to the service of Dr. Claudio .               Follow-up Information       Follow up With Specialties Details Why Contact Info Additional Information    Breanne Lassiter MD  Schedule an appointment as soon as possible for a visit in 1 week(s) Findin cm hyperdense lesion at the hepatic dome              Follow up required: contrast-enhanced MRI,              Follow up should be done within 1 month(s) [address] [place] [id number]  [phone]       [facility]  [facility] Schedule an appointment as soon as possible for a visit in 1 month(s) Discuss colonoscopy [address]  [address]  [facility] [address] [phone] [facility], [address], [address], [place], [place], [address] [phone]            Current Discharge Medication List        CONTINUE these medications which have NOT CHANGED    Details   albuterol (PROVENTIL HFA,VENTOLIN HFA) 90 mcg/act inhaler Inhale 2 puffs every 6 (six) hours as needed for wheezing or shortness of breath  Qty: 1 Inhaler, Refills: 0    Associated Diagnoses: Acute bronchitis, unspecified organism      benzonatate (TESSALON PERLES) 100 mg capsule Take 1 capsule (100 mg total) by mouth 3 (three) times a day as needed for cough  Qty: 30 capsule, Refills: 0    Associated Diagnoses: Acute bronchitis, unspecified organism      fluticasone (FLONASE) 50 mcg/act nasal spray 1 spray into each nostril daily  Qty: 1 Bottle, Refills: 0    Associated Diagnoses: Acute bronchitis, unspecified organism                 PDMP Review       None            ED Provider  Electronically Signed by             Malena Larsen PA-C  06/21/24 4104

## 2024-06-21 NOTE — INCIDENTAL FINDINGS
The following findings require follow up:  Radiographic finding   Findin cm hyperdense lesion at the hepatic dome   Follow up required: contrast-enhanced MRI,   Follow up should be done within 1 month(s)    Please notify the following clinician to assist with the follow up:   Breanne Lassiter MD, PCP      Incidental finding results were discussed with the Patient by Brice Dowell MD on 24.   They expressed understanding and all questions answered.

## 2024-06-22 LAB
ALBUMIN SERPL BCG-MCNC: 3.9 G/DL (ref 3.5–5)
ALBUMIN SERPL BCG-MCNC: 4 G/DL (ref 3.5–5)
ALP SERPL-CCNC: 48 U/L (ref 34–104)
ALP SERPL-CCNC: 49 U/L (ref 34–104)
ALT SERPL W P-5'-P-CCNC: 27 U/L (ref 7–52)
ALT SERPL W P-5'-P-CCNC: 31 U/L (ref 7–52)
ANION GAP SERPL CALCULATED.3IONS-SCNC: 6 MMOL/L (ref 4–13)
ANION GAP SERPL CALCULATED.3IONS-SCNC: 9 MMOL/L (ref 4–13)
AST SERPL W P-5'-P-CCNC: 13 U/L (ref 13–39)
AST SERPL W P-5'-P-CCNC: 14 U/L (ref 13–39)
BASOPHILS # BLD AUTO: 0.03 THOUSANDS/ÂΜL (ref 0–0.1)
BASOPHILS NFR BLD AUTO: 0 % (ref 0–1)
BILIRUB DIRECT SERPL-MCNC: 0.42 MG/DL (ref 0–0.2)
BILIRUB SERPL-MCNC: 1.7 MG/DL (ref 0.2–1)
BILIRUB SERPL-MCNC: 2.02 MG/DL (ref 0.2–1)
BUN SERPL-MCNC: 7 MG/DL (ref 5–25)
BUN SERPL-MCNC: 7 MG/DL (ref 5–25)
CALCIUM SERPL-MCNC: 8.4 MG/DL (ref 8.4–10.2)
CALCIUM SERPL-MCNC: 8.4 MG/DL (ref 8.4–10.2)
CHLORIDE SERPL-SCNC: 102 MMOL/L (ref 96–108)
CHLORIDE SERPL-SCNC: 105 MMOL/L (ref 96–108)
CO2 SERPL-SCNC: 23 MMOL/L (ref 21–32)
CO2 SERPL-SCNC: 25 MMOL/L (ref 21–32)
CREAT SERPL-MCNC: 0.88 MG/DL (ref 0.6–1.3)
CREAT SERPL-MCNC: 0.94 MG/DL (ref 0.6–1.3)
EOSINOPHIL # BLD AUTO: 0.02 THOUSAND/ÂΜL (ref 0–0.61)
EOSINOPHIL NFR BLD AUTO: 0 % (ref 0–6)
ERYTHROCYTE [DISTWIDTH] IN BLOOD BY AUTOMATED COUNT: 12.4 % (ref 11.6–15.1)
GFR SERPL CREATININE-BSD FRML MDRD: 101 ML/MIN/1.73SQ M
GFR SERPL CREATININE-BSD FRML MDRD: 108 ML/MIN/1.73SQ M
GLUCOSE SERPL-MCNC: 103 MG/DL (ref 65–140)
GLUCOSE SERPL-MCNC: 99 MG/DL (ref 65–140)
HCT VFR BLD AUTO: 39.7 % (ref 36.5–49.3)
HGB BLD-MCNC: 13.3 G/DL (ref 12–17)
IMM GRANULOCYTES # BLD AUTO: 0.1 THOUSAND/UL (ref 0–0.2)
IMM GRANULOCYTES NFR BLD AUTO: 1 % (ref 0–2)
LYMPHOCYTES # BLD AUTO: 1.77 THOUSANDS/ÂΜL (ref 0.6–4.47)
LYMPHOCYTES NFR BLD AUTO: 10 % (ref 14–44)
MAGNESIUM SERPL-MCNC: 2.2 MG/DL (ref 1.9–2.7)
MCH RBC QN AUTO: 27.4 PG (ref 26.8–34.3)
MCHC RBC AUTO-ENTMCNC: 33.5 G/DL (ref 31.4–37.4)
MCV RBC AUTO: 82 FL (ref 82–98)
MONOCYTES # BLD AUTO: 1.43 THOUSAND/ÂΜL (ref 0.17–1.22)
MONOCYTES NFR BLD AUTO: 8 % (ref 4–12)
NEUTROPHILS # BLD AUTO: 14.54 THOUSANDS/ÂΜL (ref 1.85–7.62)
NEUTS SEG NFR BLD AUTO: 81 % (ref 43–75)
NRBC BLD AUTO-RTO: 0 /100 WBCS
PLATELET # BLD AUTO: 224 THOUSANDS/UL (ref 149–390)
PMV BLD AUTO: 11.8 FL (ref 8.9–12.7)
POTASSIUM SERPL-SCNC: 3.1 MMOL/L (ref 3.5–5.3)
POTASSIUM SERPL-SCNC: 3.4 MMOL/L (ref 3.5–5.3)
PROT SERPL-MCNC: 7.1 G/DL (ref 6.4–8.4)
PROT SERPL-MCNC: 7.2 G/DL (ref 6.4–8.4)
RBC # BLD AUTO: 4.86 MILLION/UL (ref 3.88–5.62)
SODIUM SERPL-SCNC: 134 MMOL/L (ref 135–147)
SODIUM SERPL-SCNC: 136 MMOL/L (ref 135–147)
WBC # BLD AUTO: 17.89 THOUSAND/UL (ref 4.31–10.16)

## 2024-06-22 PROCEDURE — 85025 COMPLETE CBC W/AUTO DIFF WBC: CPT

## 2024-06-22 PROCEDURE — 83735 ASSAY OF MAGNESIUM: CPT

## 2024-06-22 PROCEDURE — 82248 BILIRUBIN DIRECT: CPT

## 2024-06-22 PROCEDURE — 80053 COMPREHEN METABOLIC PANEL: CPT

## 2024-06-22 PROCEDURE — 99232 SBSQ HOSP IP/OBS MODERATE 35: CPT | Performed by: SURGERY

## 2024-06-22 RX ORDER — POTASSIUM CHLORIDE 20 MEQ/1
40 TABLET, EXTENDED RELEASE ORAL ONCE
Status: COMPLETED | OUTPATIENT
Start: 2024-06-22 | End: 2024-06-22

## 2024-06-22 RX ORDER — POTASSIUM CHLORIDE 14.9 MG/ML
20 INJECTION INTRAVENOUS
Status: COMPLETED | OUTPATIENT
Start: 2024-06-22 | End: 2024-06-22

## 2024-06-22 RX ORDER — POTASSIUM CHLORIDE 14.9 MG/ML
20 INJECTION INTRAVENOUS ONCE
Status: COMPLETED | OUTPATIENT
Start: 2024-06-22 | End: 2024-06-22

## 2024-06-22 RX ORDER — ACETAMINOPHEN 325 MG/1
975 TABLET ORAL EVERY 6 HOURS SCHEDULED
Status: DISCONTINUED | OUTPATIENT
Start: 2024-06-22 | End: 2024-06-23

## 2024-06-22 RX ADMIN — ENOXAPARIN SODIUM 40 MG: 40 INJECTION SUBCUTANEOUS at 08:43

## 2024-06-22 RX ADMIN — PIPERACILLIN SODIUM AND TAZOBACTAM SODIUM 4.5 G: 36; 4.5 INJECTION, POWDER, LYOPHILIZED, FOR SOLUTION INTRAVENOUS at 13:15

## 2024-06-22 RX ADMIN — MAGNESIUM SULFATE HEPTAHYDRATE 2 G: 40 INJECTION, SOLUTION INTRAVENOUS at 03:02

## 2024-06-22 RX ADMIN — POTASSIUM CHLORIDE 20 MEQ: 14.9 INJECTION, SOLUTION INTRAVENOUS at 03:38

## 2024-06-22 RX ADMIN — ACETAMINOPHEN 975 MG: 325 TABLET, FILM COATED ORAL at 05:34

## 2024-06-22 RX ADMIN — PIPERACILLIN SODIUM AND TAZOBACTAM SODIUM 4.5 G: 36; 4.5 INJECTION, POWDER, LYOPHILIZED, FOR SOLUTION INTRAVENOUS at 00:37

## 2024-06-22 RX ADMIN — ACETAMINOPHEN 975 MG: 325 TABLET, FILM COATED ORAL at 20:32

## 2024-06-22 RX ADMIN — POTASSIUM CHLORIDE 40 MEQ: 1500 TABLET, EXTENDED RELEASE ORAL at 20:32

## 2024-06-22 RX ADMIN — PIPERACILLIN SODIUM AND TAZOBACTAM SODIUM 4.5 G: 36; 4.5 INJECTION, POWDER, LYOPHILIZED, FOR SOLUTION INTRAVENOUS at 22:29

## 2024-06-22 RX ADMIN — ACETAMINOPHEN 975 MG: 325 TABLET, FILM COATED ORAL at 14:14

## 2024-06-22 RX ADMIN — SODIUM CHLORIDE, SODIUM GLUCONATE, SODIUM ACETATE, POTASSIUM CHLORIDE, MAGNESIUM CHLORIDE, SODIUM PHOSPHATE, DIBASIC, AND POTASSIUM PHOSPHATE 125 ML/HR: .53; .5; .37; .037; .03; .012; .00082 INJECTION, SOLUTION INTRAVENOUS at 19:13

## 2024-06-22 RX ADMIN — PIPERACILLIN SODIUM AND TAZOBACTAM SODIUM 4.5 G: 36; 4.5 INJECTION, POWDER, LYOPHILIZED, FOR SOLUTION INTRAVENOUS at 05:34

## 2024-06-22 RX ADMIN — SODIUM CHLORIDE, SODIUM GLUCONATE, SODIUM ACETATE, POTASSIUM CHLORIDE, MAGNESIUM CHLORIDE, SODIUM PHOSPHATE, DIBASIC, AND POTASSIUM PHOSPHATE 125 ML/HR: .53; .5; .37; .037; .03; .012; .00082 INJECTION, SOLUTION INTRAVENOUS at 00:30

## 2024-06-22 RX ADMIN — POTASSIUM CHLORIDE 20 MEQ: 14.9 INJECTION, SOLUTION INTRAVENOUS at 01:44

## 2024-06-22 RX ADMIN — POTASSIUM CHLORIDE 20 MEQ: 14.9 INJECTION, SOLUTION INTRAVENOUS at 20:33

## 2024-06-22 NOTE — SEPSIS NOTE
"  Sepsis Note   Grant Shannon 39 y.o. male MRN: [id number]  Unit/Bed#: S -01 Encounter: [id number]       Initial Sepsis Screening       Row Name 06/21/24 2109 06/21/24 1435             Is the patient's history suggestive of a new or worsening infection? Yes (Proceed)  -BS Yes (Proceed)  -AB       Suspected source of infection acute abdominal infection  -BS acute abdominal infection  -AB       Indicate SIRS criteria Hyperthemia > 38.3C (100.9F) OR Hypothermia <36C (96.8F);Tachycardia > 90 bpm;Leukocytosis (WBC > 95624 IJL) OR Leukopenia (WBC <4000 IJL) OR Bandemia (WBC >10% bands)  -BS Tachycardia > 90 bpm;Leukocytosis (WBC > 47576 IJL) OR Leukopenia (WBC <4000 IJL) OR Bandemia (WBC >10% bands)  -AB       Are two or more of the above signs & symptoms of infection both present and new to the patient? Yes (Proceed)  -BS Yes (Proceed)  -AB       Assess for evidence of organ dysfunction: Are any of the below criteria present within 6 hours of suspected infection and SIRS criteria that are NOT considered to be chronic conditions? -- --                 User Key  (r) = Recorded By, (t) = Taken By, (c) = Cosigned By      Initials Name Provider Type    AB Malena Larsen PA-C Physician Assistant    SHEREEN Dowell MD Resident                    Default Flowsheet Data (Last 720 Hours)       Sepsis Reassess       Row Name 06/21/24 2114                   Repeat Volume Status and Tissue Perfusion Assessment Performed    Date of Reassessment: 06/21/24  -        Time of Reassessment: 2114  -BS        Sepsis Reassessment Note: Click \"NEXT\" below (NOT \"close\") to generate sepsis reassessment note. YES (proceed by clicking \"NEXT\")  -BS        Repeat Volume Status and Tissue Perfusion Assessment Performed --                  User Key  (r) = Recorded By, (t) = Taken By, (c) = Cosigned By      Initials Name Provider Type    BS Brice Dowell MD Resident                    Body mass index " is 38.31 kg/m².  Wt Readings from Last 1 Encounters:   06/21/24 114 kg (251 lb 15.8 oz)     IBW (Ideal Body Weight): 68.4 kg    Ideal body weight: 68.4 kg (150 lb 12.7 oz)  Adjusted ideal body weight: 86.8 kg (191 lb 4.3 oz)    Given 1L bolus of plasmalyte. Additional 1L bolus ordered. Continuous rate at 125 cc/hr. HR improving. Scheduled tylenol. Broadened to extended infusion Zosyn. Blood cultures pending.    Brice Dowell MD  General Surgery   06/21/24

## 2024-06-22 NOTE — PROGRESS NOTES
Progress Note - General Surgery  Grant Shannon 39 y.o. male MRN: [id number]  Unit/Bed#: S -01 Encounter: [id number]      Assessment:  39 y.o. male with acute diverticulitis with possible small contained perforation. Afebrile, tachycardia, leukocytosis.        Plan:  - CLD  - IVF until tolerating regular diet  - Zosyn  - Trend WBC and fever curve  - DVT ppx with lovenox   - PRN analgesia and antiemetics  - Patient will require a colonoscopy in approximately 6 weeks. Outpatient MRI with contrast for evaluation of liver lesion.         Subjective: No acute events overnight. Afebrile, hemodynamically stable. Tolerating diet. No nausea, or vomiting, fevers or chills.         Objective:   Temp:  [97.5 °F (36.4 °C)-102.5 °F (39.2 °C)] 98.2 °F (36.8 °C)  HR:  [] 93  Resp:  [18-20] 18  BP: (108-140)/(56-85) 121/73    Physical Exam:  General: No acute distress, alert and oriented  CV: Well perfused, regular rate and rhythm  Lungs: Normal work of breathing, no increased respiratory effort  Abdomen: Soft, minimally tender, non-distended, no rebound or guarding  Extremities: No edema, clubbing or cyanosis  Skin: Warm, dry      I/O         06/20 0701  06/21 0700 06/21 0701  06/22 0700 06/22 0701  06/23 0700    P.O.   0    IV Piggyback  50     Total Intake(mL/kg)  50 (0.4) 0 (0)    Urine (mL/kg/hr)  600 500 (1.8)    Stool  0     Total Output  600 500    Net  -550 -500           Unmeasured Urine Occurrence  1 x     Unmeasured Stool Occurrence  2 x             Lab, Imaging and other studies: I have personally reviewed pertinent reports.  , CBC with diff:   Lab Results   Component Value Date    WBC 17.89 (H) 06/22/2024    HGB 13.3 06/22/2024    HCT 39.7 06/22/2024    MCV 82 06/22/2024     06/22/2024    RBC 4.86 06/22/2024    MCH 27.4 06/22/2024    MCHC 33.5 06/22/2024    RDW 12.4 06/22/2024    MPV 11.8 06/22/2024    NRBC 0 06/22/2024   , BMP/CMP:   Lab Results   Component Value Date    SODIUM 136 06/22/2024    K  3.4 (L) 06/22/2024     06/22/2024    CO2 25 06/22/2024    BUN 7 06/22/2024    CREATININE 0.94 06/22/2024    CALCIUM 8.4 06/22/2024    AST 14 06/22/2024    ALT 31 06/22/2024    ALKPHOS 48 06/22/2024    EGFR 101 06/22/2024           Brice Dowell MD  General Surgery   06/22/24

## 2024-06-22 NOTE — UTILIZATION REVIEW
Initial Clinical Review    Admission: Date/Time/Statement:   Admission Orders (From admission, onward)       Ordered        06/21/24 1742  INPATIENT ADMISSION  Once                          Orders Placed This Encounter   Procedures    INPATIENT ADMISSION     Standing Status:   Standing     Number of Occurrences:   1     Order Specific Question:   Level of Care     Answer:   Med Surg [16]     Order Specific Question:   Estimated length of stay     Answer:   More than 2 Midnights     Order Specific Question:   Certification     Answer:   I certify that inpatient services are medically necessary for this patient for a duration of greater than two midnights. See H&P and MD Progress Notes for additional information about the patient's course of treatment.     ED Arrival Information       Expected   -    Arrival   6/21/2024 13:22    Acuity   Urgent              Means of arrival   -    Escorted by   -    Service   Surgery-General    Admission type   Emergency              Arrival complaint   right side abdominal pain             Chief Complaint   Patient presents with    Abdominal Pain     Pt reports he is concerned for appendicitis. Today he woke up with mid abd pain that radiates to R side. Pt endorses chills. Denies nausea/vomiting.        Initial Presentation: 39 y.o. male with no significant past medical or surgical history who presents with one day of abdominal pain with associated fevers . Pain started this am. Pt passing flatus, having bm's. On exam,pt tachycardic ,  abdomen is soft, obese, tender in the alex umbilical region, no rebound or guarding.   . Labs  : WBC 19.66, K 3.4  .  CT A/P shows sigmoid diverticulitis. Possible small contained perforation. Hepatomegaly with steatosis.1 cm hyperdense lesion at the hepatic dome . Pt given IV abx in ED. Admitted as Inpatient by general sx service with acute diverticulitis with possible small contained perforation . Plan0- NPO. IVF. 1L Bolus of plasmalyte    IV abx-  Rocephin and flagyl . Trend WBC, fever curve . Pain control. PRN antiemetics . DVT ppx- Lovenox .  Will require a colonoscopy in approximately 6 weeks. Outpatient MRI with contrast for evaluation of liver lesion.    Update - IV abx broadened to Zosyn given ongoing fevers, tachycardia. Temp 102.5 .5 F . Sepsis . Additional 1 L IVF ordered in addition to one previously ordered. Continuous rate at 125 cc/hr. HR improving. Scheduled tylenol. F/U blood cx .     Date: 6/22 Day 2 Diverticulitis with microperforation.    Temp 102.4 F today with tachycardia. . Pt continues on IV Zosyn . F/U  blood cx . Abdomen soft, minimally tender, non-distended. Started CL diet . Continue IVF until johan reg diet . . WBC down to 17.89 today . K repletion ordered . T bili 2.02 today from 0.88 . Trend WBC. CMP, Mag ,  CBC in  am     ED Triage Vitals   Temperature Pulse Respirations Blood Pressure SpO2 Pain Score   06/21/24 1358 06/21/24 1358 06/21/24 1358 06/21/24 1358 06/21/24 1358 06/21/24 2214   99.9 °F (37.7 °C) (!) 115 20 132/80 98 % 6     Weight (last 2 days)       Date/Time Weight    06/21/24 1358 114 (251.99)            Vital Signs (last 3 days)       Date/Time Temp Pulse Resp BP MAP (mmHg) SpO2 O2 Device Pain    06/22/24 15:55:18 99.2 °F (37.3 °C) 99 18 125/82 96 91 % -- --    06/22/24 1414 -- -- -- -- -- -- -- Med Not Given for Pain - for MAR use only    06/22/24 14:02:53 100.9 °F (38.3 °C) 109 -- -- -- 94 % -- --    06/22/24 12:38:59 102.4 °F (39.1 °C) 103 -- -- -- 95 % -- --    06/22/24 12:37:39 102.4 °F (39.1 °C) 101 -- -- -- 96 % -- --    06/22/24 0900 -- -- -- -- -- -- None (Room air) 2    06/22/24 07:58:59 98.2 °F (36.8 °C) 93 18 121/73 89 92 % -- --    06/22/24 03:10:27 98.5 °F (36.9 °C) 90 18 113/71 85 93 % -- --    06/22/24 00:06:46 97.5 °F (36.4 °C) 102 18 108/66 80 95 % -- --    06/21/24 2236 -- -- -- -- -- -- None (Room air) --    06/21/24 2214 -- -- -- -- -- -- -- 6    06/21/24 19:23:25 102.5 °F (39.2 °C) -- 19 129/85  100 -- -- --    06/21/24 1833 101.5 °F (38.6 °C) -- -- -- -- -- -- --    06/21/24 1720 -- 111 -- 140/74 100 96 % None (Room air) --    06/21/24 1500 -- 110 -- 114/56 72 96 % None (Room air) --    06/21/24 1358 99.9 °F (37.7 °C) 115 20 132/80 -- 98 % None (Room air) --              Pertinent Labs/Diagnostic Test Results:   Radiology:  CT abdomen pelvis with contrast   Final Interpretation by Darrian Berrios MD (06/21 1647)      Sigmoid diverticulitis.   No obvious extraluminal free air. No adjacent fluid collection.   However the involved diverticula contains gas that extends slightly beyond the expected margin of the diverticula. See key images.   Therefore a small contained perforation is not excluded.      Hepatomegaly with steatosis.   1 cm hyperdense lesion at the hepatic dome is not completely evaluated on this single phase exam. No priors for comparison   Per chart review, in the setting of nonalcoholic steatohepatitis, contrast-enhanced MRI is recommended for further evaluation.      Findings were discussed with Malena BLACKWOOD at 4:45 p.m. on 6/21/2024         Workstation performed: [id number]                     Results from last 7 days   Lab Units 06/22/24  0513 06/21/24  1406   WBC Thousand/uL 17.89* 19.66*   HEMOGLOBIN g/dL 13.3 14.2   HEMATOCRIT % 39.7 42.0   PLATELETS Thousands/uL 224 253   TOTAL NEUT ABS Thousands/µL 14.54* 15.99*         Results from last 7 days   Lab Units 06/22/24  1441 06/22/24  0513 06/21/24  1459 06/21/24  1406   SODIUM mmol/L 134* 136  --  137   POTASSIUM mmol/L 3.1* 3.4*  --  3.4*   CHLORIDE mmol/L 102 105  --  102   CO2 mmol/L 23 25  --  24   ANION GAP mmol/L 9 6  --  11   BUN mg/dL 7 7  --  10   CREATININE mg/dL 0.88 0.94  --  1.01   EGFR ml/min/1.73sq m 108 101  --  93   CALCIUM mg/dL 8.4 8.4  --  9.6   MAGNESIUM mg/dL  --  2.2 1.7*  --      Results from last 7 days   Lab Units 06/22/24  1441 06/22/24  0513 06/21/24  1406   AST U/L 13 14 22   ALT U/L 27 31 42    ALK PHOS U/L 49 48 58   TOTAL PROTEIN g/dL 7.2 7.1 7.7   ALBUMIN g/dL 3.9 4.0 4.6   TOTAL BILIRUBIN mg/dL 1.70* 2.02* 0.88   BILIRUBIN DIRECT mg/dL 0.42*  --   --          Results from last 7 days   Lab Units 06/22/24  1441 06/22/24  0513 06/21/24  1406   GLUCOSE RANDOM mg/dL 99 103 100             Results from last 7 days   Lab Units 06/21/24  1459   PROCALCITONIN ng/ml 0.13     Results from last 7 days   Lab Units 06/21/24  1459   LACTIC ACID mmol/L 1.1               Results from last 7 days   Lab Units 06/21/24  1406   LIPASE u/L 15           Results from last 7 days   Lab Units 06/21/24  1630 06/21/24  1459   BLOOD CULTURE  Received in Microbiology Lab. Culture in Progress. Received in Microbiology Lab. Culture in Progress.                   ED Treatment-Medication Administration from 06/21/2024 1322 to 06/21/2024 1911         Date/Time Order Dose Route Action     06/21/2024 1520 iohexol (OMNIPAQUE) 350 MG/ML injection (MULTI-DOSE) 100 mL 100 mL Intravenous Given     06/21/2024 1718 ceftriaxone (ROCEPHIN) 2 g/50 mL in dextrose IVPB 2,000 mg Intravenous New Bag     06/21/2024 1831 metroNIDAZOLE (FLAGYL) IVPB (premix) 500 mg 100 mL 500 mg Intravenous New Bag            History reviewed. No pertinent past medical history.  Present on Admission:   Diverticulitis of large intestine with perforation without abscess or bleeding   Sepsis without acute organ dysfunction (HCC)      Admitting Diagnosis: Abdominal pain [R10.9]  Liver lesion [K76.9]  Steatohepatitis [K75.81]  Acute diverticulitis [K57.92]  Age/Sex: 39 y.o. male  Admission Orders:  Scheduled Medications:  acetaminophen, 975 mg, Oral, Q6H MAXIMILIANO  enoxaparin, 40 mg, Subcutaneous, Daily  piperacillin-tazobactam, 4.5 g, Intravenous, Q8H    potassium chloride 20 mEq IVPB (premix)  Dose: 20 mEq  Freq: Every 2 hours Route: IV  Last Dose: 20 mEq (06/22/24 0144)  Start: 06/21/24 2115 End: 06/22/24 0114  potassium chloride (Klor-Con M20) CR tablet 40 mEq  Dose: 40  mEq  Freq: Once Route: PO  Start: 06/22/24 2000 End: 06/22/24 2032   Admin Instructions:         potassium chloride 20 mEq IVPB (premix)  Dose: 20 mEq  Freq: Once Route: IV  Last Dose: Stopped (06/22/24 2322)  Start: 06/22/24 2000 End: 06/22/24 2322   Admin Instructions:         potassium chloride 20 mEq IVPB (premix)  Dose: 20 mEq  Freq: Every 2 hours Route: IV  Last Dose: 20 mEq (06/22/24 0338)  Start: 06/22/24 0345 End: 06/22/24 0538        multi-electrolyte (ISOLYTE-S PH 7.4) bolus 1,000 mL  Dose: 1,000 mL  Freq: Once Route: IV x1 6/21 @ 2045, x1 @ 2214    metroNIDAZOLE (FLAGYL) IVPB (premix) 500 mg 100 mL  Dose: 500 mg  Freq: Every 8 hours Route: IV  Last Dose: Stopped (06/21/24 2013)  Start: 06/21/24 1700 End: 06/21/24 2108  ceftriaxone (ROCEPHIN) 2 g/50 mL in dextrose IVPB  Dose: 2,000 mg  Freq: Every 24 hours Route: IV  Last Dose: Stopped (06/21/24 1748)  Start: 06/21/24 1700 End: 06/21/24 2108  magnesium sulfate 2 g/50 mL IVPB (premix) 2 g  Dose: 2 g  Freq: Once Route: IV x1 6/22 @ 0302         Continuous IV Infusions:  multi-electrolyte, 125 mL/hr, Intravenous, Continuous      PRN Meds:  HYDROmorphone, 0.5 mg, Intravenous, Q4H PRN  nicotine, 1 patch, Transdermal, Daily PRN  ondansetron, 4 mg, Intravenous, Q6H PRN  oxyCODONE, 5 mg, Oral, Q4H PRN  oxyCODONE, 2.5 mg, Oral, Q4H PRN    OOB as johan   SCD   Nc O2 to keep sat at least 92 %   NPO--->CL diet    IP CONSULT TO ACUTE CARE SURGERY    Network Utilization Review Department  ATTENTION: Please call with any questions or concerns to [phone] and carefully listen to the prompts so that you are directed to the right person. All voicemails are confidential.   For Discharge needs, contact Care Management DC Support Team at [phone] opt. 2  Send all requests for admission clinical reviews, approved or denied determinations and any other requests to dedicated fax number below belonging to the campus where the patient is receiving treatment. List of  dedicated fax numbers for the Facilities:  FACILITY NAME UR FAX NUMBER   ADMISSION DENIALS (Administrative/Medical Necessity) [phone]   DISCHARGE SUPPORT TEAM (NETWORK) [phone]   PARENT CHILD HEALTH (Maternity/NICU/Pediatrics) [phone]   [facility] [phone]   [facility] [phone]   [facility] [phone]   [facility] [phone]   [facility] [phone]   [facility] [phone]   [facility] [phone]   [facility] [phone]   [facility] [phone]   [facility] [phone]   [facility] [phone]   [facility] [phone]

## 2024-06-22 NOTE — PLAN OF CARE
Problem: PAIN - ADULT  Goal: Verbalizes/displays adequate comfort level or baseline comfort level  Description: Interventions:  - Encourage patient to monitor pain and request assistance  - Assess pain using appropriate pain scale  - Administer analgesics based on type and severity of pain and evaluate response  - Implement non-pharmacological measures as appropriate and evaluate response  - Consider cultural and social influences on pain and pain management  - Notify physician/advanced practitioner if interventions unsuccessful or patient reports new pain  Outcome: Progressing     Problem: INFECTION - ADULT  Goal: Absence or prevention of progression during hospitalization  Description: INTERVENTIONS:  - Assess and monitor for signs and symptoms of infection  - Monitor lab/diagnostic results  - Monitor all insertion sites, i.e. indwelling lines, tubes, and drains  - Monitor endotracheal if appropriate and nasal secretions for changes in amount and color  - [place] appropriate cooling/warming therapies per order  - Administer medications as ordered  - Instruct and encourage patient and family to use good hand hygiene technique  - Identify and instruct in appropriate isolation precautions for identified infection/condition  Outcome: Progressing     Problem: Knowledge Deficit  Goal: Patient/family/caregiver demonstrates understanding of disease process, treatment plan, medications, and discharge instructions  Description: Complete learning assessment and assess knowledge base.  Interventions:  - Provide teaching at level of understanding  - Provide teaching via preferred learning methods  Outcome: Progressing

## 2024-06-23 ENCOUNTER — APPOINTMENT (INPATIENT)
Dept: CT IMAGING | Facility: HOSPITAL | Age: 39
DRG: 392 | End: 2024-06-23
Payer: COMMERCIAL

## 2024-06-23 LAB
ALBUMIN SERPL BCG-MCNC: 4 G/DL (ref 3.5–5)
ALP SERPL-CCNC: 46 U/L (ref 34–104)
ALT SERPL W P-5'-P-CCNC: 23 U/L (ref 7–52)
ANION GAP SERPL CALCULATED.3IONS-SCNC: 8 MMOL/L (ref 4–13)
AST SERPL W P-5'-P-CCNC: 12 U/L (ref 13–39)
BASOPHILS # BLD AUTO: 0.04 THOUSANDS/ÂΜL (ref 0–0.1)
BASOPHILS NFR BLD AUTO: 0 % (ref 0–1)
BILIRUB SERPL-MCNC: 1.14 MG/DL (ref 0.2–1)
BUN SERPL-MCNC: 8 MG/DL (ref 5–25)
CALCIUM SERPL-MCNC: 8.4 MG/DL (ref 8.4–10.2)
CHLORIDE SERPL-SCNC: 105 MMOL/L (ref 96–108)
CO2 SERPL-SCNC: 22 MMOL/L (ref 21–32)
CREAT SERPL-MCNC: 0.78 MG/DL (ref 0.6–1.3)
EOSINOPHIL # BLD AUTO: 0.1 THOUSAND/ÂΜL (ref 0–0.61)
EOSINOPHIL NFR BLD AUTO: 1 % (ref 0–6)
ERYTHROCYTE [DISTWIDTH] IN BLOOD BY AUTOMATED COUNT: 12.3 % (ref 11.6–15.1)
GFR SERPL CREATININE-BSD FRML MDRD: 113 ML/MIN/1.73SQ M
GLUCOSE SERPL-MCNC: 85 MG/DL (ref 65–140)
HCT VFR BLD AUTO: 39.4 % (ref 36.5–49.3)
HGB BLD-MCNC: 12.9 G/DL (ref 12–17)
IMM GRANULOCYTES # BLD AUTO: 0.08 THOUSAND/UL (ref 0–0.2)
IMM GRANULOCYTES NFR BLD AUTO: 1 % (ref 0–2)
LYMPHOCYTES # BLD AUTO: 2.15 THOUSANDS/ÂΜL (ref 0.6–4.47)
LYMPHOCYTES NFR BLD AUTO: 14 % (ref 14–44)
MAGNESIUM SERPL-MCNC: 2.1 MG/DL (ref 1.9–2.7)
MCH RBC QN AUTO: 27.1 PG (ref 26.8–34.3)
MCHC RBC AUTO-ENTMCNC: 32.7 G/DL (ref 31.4–37.4)
MCV RBC AUTO: 83 FL (ref 82–98)
MONOCYTES # BLD AUTO: 1.17 THOUSAND/ÂΜL (ref 0.17–1.22)
MONOCYTES NFR BLD AUTO: 8 % (ref 4–12)
NEUTROPHILS # BLD AUTO: 11.98 THOUSANDS/ÂΜL (ref 1.85–7.62)
NEUTS SEG NFR BLD AUTO: 76 % (ref 43–75)
NRBC BLD AUTO-RTO: 0 /100 WBCS
PHOSPHATE SERPL-MCNC: 1.4 MG/DL (ref 2.7–4.5)
PLATELET # BLD AUTO: 209 THOUSANDS/UL (ref 149–390)
PMV BLD AUTO: 12 FL (ref 8.9–12.7)
POTASSIUM SERPL-SCNC: 3.6 MMOL/L (ref 3.5–5.3)
PROT SERPL-MCNC: 7.4 G/DL (ref 6.4–8.4)
RBC # BLD AUTO: 4.76 MILLION/UL (ref 3.88–5.62)
SODIUM SERPL-SCNC: 135 MMOL/L (ref 135–147)
WBC # BLD AUTO: 15.52 THOUSAND/UL (ref 4.31–10.16)

## 2024-06-23 PROCEDURE — 74177 CT ABD & PELVIS W/CONTRAST: CPT

## 2024-06-23 PROCEDURE — 85025 COMPLETE CBC W/AUTO DIFF WBC: CPT

## 2024-06-23 PROCEDURE — 80053 COMPREHEN METABOLIC PANEL: CPT

## 2024-06-23 PROCEDURE — 84100 ASSAY OF PHOSPHORUS: CPT

## 2024-06-23 PROCEDURE — 83735 ASSAY OF MAGNESIUM: CPT

## 2024-06-23 PROCEDURE — 99232 SBSQ HOSP IP/OBS MODERATE 35: CPT | Performed by: SURGERY

## 2024-06-23 PROCEDURE — 87040 BLOOD CULTURE FOR BACTERIA: CPT

## 2024-06-23 RX ORDER — SODIUM CHLORIDE, SODIUM GLUCONATE, SODIUM ACETATE, POTASSIUM CHLORIDE, MAGNESIUM CHLORIDE, SODIUM PHOSPHATE, DIBASIC, AND POTASSIUM PHOSPHATE .53; .5; .37; .037; .03; .012; .00082 G/100ML; G/100ML; G/100ML; G/100ML; G/100ML; G/100ML; G/100ML
1000 INJECTION, SOLUTION INTRAVENOUS ONCE
Status: COMPLETED | OUTPATIENT
Start: 2024-06-23 | End: 2024-06-23

## 2024-06-23 RX ORDER — ACETAMINOPHEN 325 MG/1
975 TABLET ORAL EVERY 8 HOURS SCHEDULED
Status: DISCONTINUED | OUTPATIENT
Start: 2024-06-23 | End: 2024-06-23

## 2024-06-23 RX ORDER — ACETAMINOPHEN 325 MG/1
975 TABLET ORAL EVERY 8 HOURS SCHEDULED
Status: DISCONTINUED | OUTPATIENT
Start: 2024-06-23 | End: 2024-06-25 | Stop reason: HOSPADM

## 2024-06-23 RX ADMIN — POTASSIUM PHOSPHATE, MONOBASIC AND POTASSIUM PHOSPHATE, DIBASIC 30 MMOL: 224; 236 INJECTION, SOLUTION, CONCENTRATE INTRAVENOUS at 09:07

## 2024-06-23 RX ADMIN — IOHEXOL 100 ML: 350 INJECTION, SOLUTION INTRAVENOUS at 20:34

## 2024-06-23 RX ADMIN — IOHEXOL 50 ML: 240 INJECTION, SOLUTION INTRATHECAL; INTRAVASCULAR; INTRAVENOUS; ORAL at 19:00

## 2024-06-23 RX ADMIN — SODIUM CHLORIDE, SODIUM GLUCONATE, SODIUM ACETATE, POTASSIUM CHLORIDE, MAGNESIUM CHLORIDE, SODIUM PHOSPHATE, DIBASIC, AND POTASSIUM PHOSPHATE 100 ML/HR: .53; .5; .37; .037; .03; .012; .00082 INJECTION, SOLUTION INTRAVENOUS at 19:21

## 2024-06-23 RX ADMIN — PIPERACILLIN SODIUM AND TAZOBACTAM SODIUM 4.5 G: 36; 4.5 INJECTION, POWDER, LYOPHILIZED, FOR SOLUTION INTRAVENOUS at 06:36

## 2024-06-23 RX ADMIN — SODIUM CHLORIDE, SODIUM GLUCONATE, SODIUM ACETATE, POTASSIUM CHLORIDE, MAGNESIUM CHLORIDE, SODIUM PHOSPHATE, DIBASIC, AND POTASSIUM PHOSPHATE 1000 ML: .53; .5; .37; .037; .03; .012; .00082 INJECTION, SOLUTION INTRAVENOUS at 17:57

## 2024-06-23 RX ADMIN — ACETAMINOPHEN 975 MG: 325 TABLET, FILM COATED ORAL at 02:27

## 2024-06-23 RX ADMIN — PIPERACILLIN SODIUM AND TAZOBACTAM SODIUM 4.5 G: 36; 4.5 INJECTION, POWDER, LYOPHILIZED, FOR SOLUTION INTRAVENOUS at 14:04

## 2024-06-23 RX ADMIN — ACETAMINOPHEN 975 MG: 325 TABLET, FILM COATED ORAL at 16:38

## 2024-06-23 RX ADMIN — ENOXAPARIN SODIUM 40 MG: 40 INJECTION SUBCUTANEOUS at 08:11

## 2024-06-23 RX ADMIN — ACETAMINOPHEN 975 MG: 325 TABLET, FILM COATED ORAL at 08:11

## 2024-06-23 NOTE — PROGRESS NOTES
"Progress Note - General Surgery   Grant Shannon 39 y.o. male MRN: [id number]  Unit/Bed#: S -01 Encounter: [id number]    Assessment:  39 y.o. male with acute diverticulitis with possible small contained perforation     Plan:  -cont CLD  -f/u blood culture  -isolyte to 100cc  -Cont abx- Zosyn  -if fevers persist- consider rescan w/ PO contrast  -dvt ppx  -oob and ambulation TID  -encourage IS use    Subjective/Objective     Subjective: no acute events overnight. Feels well some mild discomfort to LLQ, no N/V johan CLD. Loose Bms, feels like he could eat more substantial food.    Objective: tmax 101F 9pm, afebrile overnight.    Blood pressure 144/86, pulse 82, temperature 98.3 °F (36.8 °C), resp. rate 18, height 5' 8\" (1.727 m), weight 114 kg (251 lb 15.8 oz), SpO2 95%.,Body mass index is 38.31 kg/m².    UOP: 500 cc + UM    Invasive Devices       Peripheral Intravenous Line  Duration             Peripheral IV 06/21/24 Right Antecubital 1 day    Peripheral IV 06/22/24 Dorsal (posterior);Left Forearm 1 day                    Physical Exam:  General: No acute distress, alert and oriented  CV: RRR  Lungs: Normal work of breathing   Abdomen: soft, mildly ttp to RLQ, non distended  Skin: Warm, dry    Lab, Imaging and other studies:  Recent Labs     06/22/24  0513 06/22/24  1441 06/23/24  0636   WBC 17.89*  --  15.52*   HGB 13.3  --  12.9     --  209   SODIUM 136 134* 135   K 3.4* 3.1* 3.6    102 105   CO2 25 23 22   BUN 7 7 8   CREATININE 0.94 0.88 0.78   GLUC 103 99 85   CALCIUM 8.4 8.4 8.4   MG 2.2  --  2.1   PHOS  --   --  1.4*   AST 14 13 12*   ALT 31 27 23   ALKPHOS 48 49 46   TBILI 2.02* 1.70* 1.14*     VTE Pharmacologic Prophylaxis: Enoxaparin (Lovenox)  VTE Mechanical Prophylaxis: sequential compression device    "

## 2024-06-23 NOTE — PLAN OF CARE
Problem: PAIN - ADULT  Goal: Verbalizes/displays adequate comfort level or baseline comfort level  Description: Interventions:  - Encourage patient to monitor pain and request assistance  - Assess pain using appropriate pain scale  - Administer analgesics based on type and severity of pain and evaluate response  - Implement non-pharmacological measures as appropriate and evaluate response  - Consider cultural and social influences on pain and pain management  - Notify physician/advanced practitioner if interventions unsuccessful or patient reports new pain  Outcome: Progressing     Problem: INFECTION - ADULT  Goal: Absence or prevention of progression during hospitalization  Description: INTERVENTIONS:  - Assess and monitor for signs and symptoms of infection  - Monitor lab/diagnostic results  - Monitor all insertion sites, i.e. indwelling lines, tubes, and drains  - Monitor endotracheal if appropriate and nasal secretions for changes in amount and color  - [place] appropriate cooling/warming therapies per order  - Administer medications as ordered  - Instruct and encourage patient and family to use good hand hygiene technique  - Identify and instruct in appropriate isolation precautions for identified infection/condition  Outcome: Progressing     Problem: Knowledge Deficit  Goal: Patient/family/caregiver demonstrates understanding of disease process, treatment plan, medications, and discharge instructions  Description: Complete learning assessment and assess knowledge base.  Interventions:  - Provide teaching at level of understanding  - Provide teaching via preferred learning methods  Outcome: Progressing

## 2024-06-24 LAB
ANION GAP SERPL CALCULATED.3IONS-SCNC: 7 MMOL/L (ref 4–13)
BASOPHILS # BLD AUTO: 0.06 THOUSANDS/ÂΜL (ref 0–0.1)
BASOPHILS NFR BLD AUTO: 0 % (ref 0–1)
BUN SERPL-MCNC: 7 MG/DL (ref 5–25)
CALCIUM SERPL-MCNC: 8.8 MG/DL (ref 8.4–10.2)
CHLORIDE SERPL-SCNC: 104 MMOL/L (ref 96–108)
CO2 SERPL-SCNC: 25 MMOL/L (ref 21–32)
CREAT SERPL-MCNC: 0.9 MG/DL (ref 0.6–1.3)
EOSINOPHIL # BLD AUTO: 0.2 THOUSAND/ÂΜL (ref 0–0.61)
EOSINOPHIL NFR BLD AUTO: 1 % (ref 0–6)
ERYTHROCYTE [DISTWIDTH] IN BLOOD BY AUTOMATED COUNT: 12.1 % (ref 11.6–15.1)
GFR SERPL CREATININE-BSD FRML MDRD: 107 ML/MIN/1.73SQ M
GLUCOSE SERPL-MCNC: 82 MG/DL (ref 65–140)
HCT VFR BLD AUTO: 39.4 % (ref 36.5–49.3)
HGB BLD-MCNC: 12.8 G/DL (ref 12–17)
IMM GRANULOCYTES # BLD AUTO: 0.1 THOUSAND/UL (ref 0–0.2)
IMM GRANULOCYTES NFR BLD AUTO: 1 % (ref 0–2)
LYMPHOCYTES # BLD AUTO: 2.12 THOUSANDS/ÂΜL (ref 0.6–4.47)
LYMPHOCYTES NFR BLD AUTO: 15 % (ref 14–44)
MCH RBC QN AUTO: 27.1 PG (ref 26.8–34.3)
MCHC RBC AUTO-ENTMCNC: 32.5 G/DL (ref 31.4–37.4)
MCV RBC AUTO: 83 FL (ref 82–98)
MONOCYTES # BLD AUTO: 1.07 THOUSAND/ÂΜL (ref 0.17–1.22)
MONOCYTES NFR BLD AUTO: 7 % (ref 4–12)
NEUTROPHILS # BLD AUTO: 10.97 THOUSANDS/ÂΜL (ref 1.85–7.62)
NEUTS SEG NFR BLD AUTO: 76 % (ref 43–75)
NRBC BLD AUTO-RTO: 0 /100 WBCS
PHOSPHATE SERPL-MCNC: 3 MG/DL (ref 2.7–4.5)
PLATELET # BLD AUTO: 218 THOUSANDS/UL (ref 149–390)
PMV BLD AUTO: 12 FL (ref 8.9–12.7)
POTASSIUM SERPL-SCNC: 4 MMOL/L (ref 3.5–5.3)
RBC # BLD AUTO: 4.73 MILLION/UL (ref 3.88–5.62)
SODIUM SERPL-SCNC: 136 MMOL/L (ref 135–147)
WBC # BLD AUTO: 14.52 THOUSAND/UL (ref 4.31–10.16)

## 2024-06-24 PROCEDURE — 99232 SBSQ HOSP IP/OBS MODERATE 35: CPT | Performed by: SURGERY

## 2024-06-24 PROCEDURE — 85025 COMPLETE CBC W/AUTO DIFF WBC: CPT

## 2024-06-24 PROCEDURE — 84100 ASSAY OF PHOSPHORUS: CPT

## 2024-06-24 PROCEDURE — 80048 BASIC METABOLIC PNL TOTAL CA: CPT

## 2024-06-24 RX ADMIN — ACETAMINOPHEN 975 MG: 325 TABLET, FILM COATED ORAL at 09:02

## 2024-06-24 RX ADMIN — ACETAMINOPHEN 975 MG: 325 TABLET, FILM COATED ORAL at 17:14

## 2024-06-24 RX ADMIN — ACETAMINOPHEN 975 MG: 325 TABLET, FILM COATED ORAL at 01:38

## 2024-06-24 RX ADMIN — PIPERACILLIN SODIUM AND TAZOBACTAM SODIUM 4.5 G: 36; 4.5 INJECTION, POWDER, LYOPHILIZED, FOR SOLUTION INTRAVENOUS at 19:56

## 2024-06-24 RX ADMIN — PIPERACILLIN SODIUM AND TAZOBACTAM SODIUM 4.5 G: 36; 4.5 INJECTION, POWDER, LYOPHILIZED, FOR SOLUTION INTRAVENOUS at 12:15

## 2024-06-24 RX ADMIN — SODIUM CHLORIDE, SODIUM GLUCONATE, SODIUM ACETATE, POTASSIUM CHLORIDE, MAGNESIUM CHLORIDE, SODIUM PHOSPHATE, DIBASIC, AND POTASSIUM PHOSPHATE 100 ML/HR: .53; .5; .37; .037; .03; .012; .00082 INJECTION, SOLUTION INTRAVENOUS at 17:16

## 2024-06-24 RX ADMIN — PIPERACILLIN SODIUM AND TAZOBACTAM SODIUM 4.5 G: 36; 4.5 INJECTION, POWDER, LYOPHILIZED, FOR SOLUTION INTRAVENOUS at 04:56

## 2024-06-24 NOTE — PROGRESS NOTES
"Progress Note - General Surgery   Grant Shannon 39 y.o. male MRN: [id number]  Unit/Bed#: S -01 Encounter: [id number]    Assessment:  39 y.o. male with acute diverticulitis with possible small contained perforation       Plan:  -advance diet  -keep for additional IV antibiotics given fever  -f/u blood culture, no growth 48 hours  -isolyte to 100cc  -Cont abx- Zosyn  -dvt ppx  -oob and ambulation TID  -encourage IS use  -will need outpatient c scope    Subjective/Objective     Subjective:   No acute events overnight. Pain controlled. No nausea or vomiting. Multiple BM.     Objective:     Blood pressure 132/83, pulse 89, temperature 98.2 °F (36.8 °C), resp. rate 16, height 5' 8\" (1.727 m), weight 114 kg (251 lb 15.8 oz), SpO2 90%.,Body mass index is 38.31 kg/m².      Intake/Output Summary (Last 24 hours) at 6/24/2024 0830  Last data filed at 6/23/2024 1921  Gross per 24 hour   Intake 1000 ml   Output --   Net 1000 ml       Invasive Devices       Peripheral Intravenous Line  Duration             Peripheral IV 06/21/24 Right Antecubital 2 days    Peripheral IV 06/22/24 Dorsal (posterior);Left Forearm 2 days                    Physical Exam:   Gen: NAD, Comfortable  Neuro: A&O, No focal deficits  Head: Normal Cephalic, Atraumatic  Eye: EOMI, PERRLA, No scleral icterus  Neck: Supple, No JVD, Midline trachea  CV: RRR, Cap refill <2 sec  Pulm: Normal work of breathing, no respiratory distress  Abd: Soft, Non-Distended, mild tender suprapubic   Ext: No edema, Non-tender  Skin: warm, dry, intact      " Follows with [facility] cardiology  Examines euvolemic on admit  Maintained on Lasix 20 Mg daily

## 2024-06-25 VITALS
BODY MASS INDEX: 38.19 KG/M2 | WEIGHT: 251.99 LBS | RESPIRATION RATE: 17 BRPM | TEMPERATURE: 98.1 F | HEART RATE: 91 BPM | DIASTOLIC BLOOD PRESSURE: 78 MMHG | HEIGHT: 68 IN | SYSTOLIC BLOOD PRESSURE: 120 MMHG | OXYGEN SATURATION: 96 %

## 2024-06-25 PROCEDURE — NC001 PR NO CHARGE: Performed by: SURGERY

## 2024-06-25 PROCEDURE — 99238 HOSP IP/OBS DSCHRG MGMT 30/<: CPT | Performed by: SURGERY

## 2024-06-25 RX ORDER — AMOXICILLIN AND CLAVULANATE POTASSIUM 875; 125 MG/1; MG/1
1 TABLET, FILM COATED ORAL EVERY 12 HOURS SCHEDULED
Qty: 12 TABLET | Refills: 0 | Status: SHIPPED | OUTPATIENT
Start: 2024-06-25 | End: 2024-07-01

## 2024-06-25 RX ORDER — METRONIDAZOLE 500 MG/1
500 TABLET ORAL EVERY 8 HOURS SCHEDULED
Qty: 18 TABLET | Refills: 0 | Status: SHIPPED | OUTPATIENT
Start: 2024-06-25 | End: 2024-07-01

## 2024-06-25 RX ADMIN — PIPERACILLIN SODIUM AND TAZOBACTAM SODIUM 4.5 G: 36; 4.5 INJECTION, POWDER, LYOPHILIZED, FOR SOLUTION INTRAVENOUS at 04:16

## 2024-06-25 RX ADMIN — PIPERACILLIN SODIUM AND TAZOBACTAM SODIUM 4.5 G: 36; 4.5 INJECTION, POWDER, LYOPHILIZED, FOR SOLUTION INTRAVENOUS at 11:33

## 2024-06-25 RX ADMIN — ACETAMINOPHEN 975 MG: 325 TABLET, FILM COATED ORAL at 08:47

## 2024-06-25 NOTE — DISCHARGE SUMMARY
"  Discharge Summary - Grant Shannon 39 y.o. male MRN: [id number]    Unit/Bed#: S -01 Encounter: [id number]    Admission Date:   Admission Orders (From admission, onward)       Ordered        06/21/24 1742  INPATIENT ADMISSION  Once                            Admitting Diagnosis: Abdominal pain [R10.9]  Liver lesion [K76.9]  Steatohepatitis [K75.81]  Acute diverticulitis [K57.92]    HPI: Per Dr. Dowell:  \"Grant Shannon is a 39 y.o. male with no significant past medical or surgical history who presents with one day of abdominal pain with associated fevers. Denies nausea or vomiting. Denies prior similar episodes. He says the pain started this morning and continued to worsen throughout the day. Passing flatus and having bowel movements. No prior history of diverticulitis or IBD. No know family history of colon cancer, IBD, diverticulitis. Not on AC/AP. No prior colonoscopies. CT AP with sigmoid diverticulitis. Possible small contained perforation. Hepatomegaly with steatosis.1 cm hyperdense lesion at the hepatic dome. Labs notable for a leukocytosis to 19. On exam, his abdomen is soft, obese, tender in the alex umbilical region, no rebound or guarding.\"    Procedures Performed: No orders of the defined types were placed in this encounter.      Summary of Hospital Course:  Patient presented on 6/21 with acute diverticulitis. He was admitted with IV antibiotics and serial abdominal exams. A fever prompted a repeat scan on 6/23 which was revealing of a more contained perforation with surrounding phlegmon and reactive enteritis. His diet was slowly advanced and his pain was well controlled. On 6/25 he was deemed stable for discharge with close outpatient follow up. He will need a colonoscopy in 6-8 weeks. Patient additionally with longstanding GERD and frequent Tums use. Dr. Castorena discussed with patient the ability to perform EGD/cscope at the same time to further investigate. He was also informed of his " incidental findings and the need to schedule an appointment with his PCP for further workup as indicated. He will be given a course of PO antibiotics upon discharge as well.           Significant Findings, Care, Treatment and Services Provided: As noted, see daily progress notes for details.     Incidental findings documented 6/21: 1 cm hyperdense lesion at the hepatic dome requiring PCP follow up, discussed by Dr. Dowell.    Complications: As noted, see daily progress notes for details.     Discharge Diagnosis: Diverticulitis     Medical Problems       Resolved Problems  Date Reviewed: 12/23/2018   None         Condition at Discharge: stable         Discharge instructions/Information to patient and family:   See after visit summary for information provided to patient and family.      Provisions for Follow-Up Care:  See after visit summary for information related to follow-up care and any pertinent home health orders.      PCP: Breanne Lassiter MD    Disposition: Home    Planned Readmission: No, however, patient at high risk for readmission      Discharge Statement   I spent 30 minutes discharging the patient. This time was spent on the day of discharge. I had direct contact with the patient on the day of discharge. Additional documentation is required if more than 30 minutes were spent on discharge.     Discharge Medications:  See after visit summary for reconciled discharge medications provided to patient and family.

## 2024-06-25 NOTE — PLAN OF CARE
Problem: PAIN - ADULT  Goal: Verbalizes/displays adequate comfort level or baseline comfort level  Description: Interventions:  - Encourage patient to monitor pain and request assistance  - Assess pain using appropriate pain scale  - Administer analgesics based on type and severity of pain and evaluate response  - Implement non-pharmacological measures as appropriate and evaluate response  - Consider cultural and social influences on pain and pain management  - Notify physician/advanced practitioner if interventions unsuccessful or patient reports new pain  6/25/2024 0302 by Domingo Helm RN  Outcome: Progressing  6/25/2024 0302 by Domingo Helm RN  Outcome: Progressing  6/25/2024 0302 by Domingo Helm RN  Outcome: Progressing     Problem: INFECTION - ADULT  Goal: Absence or prevention of progression during hospitalization  Description: INTERVENTIONS:  - Assess and monitor for signs and symptoms of infection  - Monitor lab/diagnostic results  - Monitor all insertion sites, i.e. indwelling lines, tubes, and drains  - Monitor endotracheal if appropriate and nasal secretions for changes in amount and color  - [place] appropriate cooling/warming therapies per order  - Administer medications as ordered  - Instruct and encourage patient and family to use good hand hygiene technique  - Identify and instruct in appropriate isolation precautions for identified infection/condition  6/25/2024 0302 by Domingo Helm RN  Outcome: Progressing  6/25/2024 0302 by Domingo Helm RN  Outcome: Progressing  6/25/2024 0302 by Domingo Helm RN  Outcome: Progressing     Problem: GASTROINTESTINAL - ADULT  Goal: Minimal or absence of nausea and/or vomiting  Description: INTERVENTIONS:  - Administer IV fluids if ordered to ensure adequate hydration  - Maintain NPO status until nausea and vomiting are resolved  - Nasogastric tube if ordered  - Administer ordered antiemetic medications as needed  - Provide nonpharmacologic comfort  measures as appropriate  - Advance diet as tolerated, if ordered  - Consider nutrition services referral to assist patient with adequate nutrition and appropriate food choices  6/25/2024 0302 by Domingo Helm RN  Outcome: Progressing  6/25/2024 0302 by Domingo Helm RN  Outcome: Progressing  6/25/2024 0302 by Domingo Helm RN  Outcome: Progressing     Problem: GASTROINTESTINAL - ADULT  Goal: Maintains or returns to baseline bowel function  Description: INTERVENTIONS:  - Assess bowel function  - Encourage oral fluids to ensure adequate hydration  - Administer IV fluids if ordered to ensure adequate hydration  - Administer ordered medications as needed  - Encourage mobilization and activity  - Consider nutritional services referral to assist patient with adequate nutrition and appropriate food choices  6/25/2024 0302 by Domingo Helm RN  Outcome: Progressing  6/25/2024 0302 by Domingo Helm RN  Outcome: Progressing  6/25/2024 0302 by Domingo Helm RN  Outcome: Progressing     Problem: Nutrition/Hydration-ADULT  Goal: Nutrient/Hydration intake appropriate for improving, restoring or maintaining nutritional needs  Description: Monitor and assess patient's nutrition/hydration status for malnutrition. Collaborate with interdisciplinary team and initiate plan and interventions as ordered.  Monitor patient's weight and dietary intake as ordered or per policy. Utilize nutrition screening tool and intervene as necessary. Determine patient's food preferences and provide high-protein, high-caloric foods as appropriate.     INTERVENTIONS:  - Monitor oral intake, urinary output, labs, and treatment plans  - Assess nutrition and hydration status and recommend course of action  - Evaluate amount of meals eaten  - Assist patient with eating if necessary   - Allow adequate time for meals  - Recommend/ encourage appropriate diets, oral nutritional supplements, and vitamin/mineral supplements  - Order, calculate, and assess  calorie counts as needed  - Recommend, monitor, and adjust tube feedings and TPN/PPN based on assessed needs  - Assess need for intravenous fluids  - Provide specific nutrition/hydration education as appropriate  - Include patient/family/caregiver in decisions related to nutrition  6/25/2024 0302 by Domingo Helm RN  Outcome: Progressing  6/25/2024 0302 by Domingo Helm RN  Outcome: Progressing     Problem: METABOLIC, FLUID AND ELECTROLYTES - ADULT  Goal: Electrolytes maintained within normal limits  Description: INTERVENTIONS:  - Monitor labs and assess patient for signs and symptoms of electrolyte imbalances  - Administer electrolyte replacement as ordered  - Monitor response to electrolyte replacements, including repeat lab results as appropriate  - Instruct patient on fluid and nutrition as appropriate  Outcome: Progressing     Problem: METABOLIC, FLUID AND ELECTROLYTES - ADULT  Goal: Fluid balance maintained  Description: INTERVENTIONS:  - Monitor labs   - Monitor I/O and WT  - Instruct patient on fluid and nutrition as appropriate  - Assess for signs & symptoms of volume excess or deficit  Outcome: Progressing

## 2024-06-25 NOTE — PROGRESS NOTES
"Progress Note - General Surgery   Grant Shannon 39 y.o. male MRN: [id number]  Unit/Bed#: S -01 Encounter: [id number]    Assessment:  39 y.o. male with acute diverticulitis with possible small contained perforation          Plan:  -advance diet  -follow AM labs  -Cont abx- Zosyn  -dvt ppx  -oob and ambulation TID  -encourage IS use  -will need outpatient c scope  -possible dc later today on oral abx      Subjective/Objective     Subjective:   No acute events overnight. No nausea or vomiting.     Objective:     Blood pressure 144/91, pulse 94, temperature 98.2 °F (36.8 °C), temperature source Oral, resp. rate 16, height 5' 8\" (1.727 m), weight 114 kg (251 lb 15.8 oz), SpO2 95%.,Body mass index is 38.31 kg/m².    No intake or output data in the 24 hours ending 06/24/24 2127    Invasive Devices       Peripheral Intravenous Line  Duration             Peripheral IV 06/21/24 Right Antecubital 3 days    Peripheral IV 06/22/24 Dorsal (posterior);Left Forearm 2 days                    Physical Exam:   Gen: NAD, Comfortable  Neuro: A&O, No focal deficits  Head: Normal Cephalic, Atraumatic  Eye: EOMI, PERRLA, No scleral icterus  Neck: Supple, No JVD, Midline trachea  CV: RRR, Cap refill <2 sec  Pulm: Normal work of breathing, no respiratory distress  Abd: Soft, Non-Distended, Non-Tender  Ext: No edema, Non-tender  Skin: warm, dry, intact      "

## 2024-06-26 LAB
BACTERIA BLD CULT: NORMAL
BACTERIA BLD CULT: NORMAL

## 2024-06-26 NOTE — UTILIZATION REVIEW
NOTIFICATION OF ADMISSION DISCHARGE   This is a Notification of Discharge from [facility]. Please be advised that this patient has been discharge from our facility. Below you will find the admission and discharge date and time including the patient’s disposition.   UTILIZATION REVIEW CONTACT:  Maribel Beckwith  Utilization   Network Utilization Review Department  Phone: [phone] carefully listen to the prompts. All voicemails are confidential.  Email: [email or web address]     ADMISSION INFORMATION  PRESENTATION DATE: 6/21/2024  2:27 PM  OBERVATION ADMISSION DATE:   INPATIENT ADMISSION DATE: 6/21/24  5:42 PM   DISCHARGE DATE: 6/25/2024  3:40 PM   DISPOSITION:Home/Self Care    Network Utilization Review Department  ATTENTION: Please call with any questions or concerns to [phone] and carefully listen to the prompts so that you are directed to the right person. All voicemails are confidential.   For Discharge needs, contact Care Management DC Support Team at [phone] opt. [phone]  Send all requests for admission clinical reviews, approved or denied determinations and any other requests to dedicated fax number below belonging to the campus where the patient is receiving treatment. List of dedicated fax numbers for the Facilities:  FACILITY NAME UR FAX NUMBER   ADMISSION DENIALS (Administrative/Medical Necessity) [phone]   DISCHARGE SUPPORT TEAM ([facility]) [phone]   PARENT CHILD HEALTH (Maternity/NICU/Pediatrics) [phone]   [facility] [phone]   [facility] [phone]   [facility] [phone]   [facility] [phone]   [facility] [phone]   [facility] [phone]   [facility] [phone]   [facility] [phone]    [facility] [phone]   [facility] [phone]   [facility] [phone]   [facility] [phone]

## 2024-06-29 LAB
BACTERIA BLD CULT: NORMAL
BACTERIA BLD CULT: NORMAL

## 2024-07-08 ENCOUNTER — APPOINTMENT (EMERGENCY)
Dept: CT IMAGING | Facility: HOSPITAL | Age: 39
DRG: 872 | End: 2024-07-08
Payer: COMMERCIAL

## 2024-07-08 ENCOUNTER — HOSPITAL ENCOUNTER (INPATIENT)
Facility: HOSPITAL | Age: 39
LOS: 2 days | Discharge: HOME | DRG: 872 | End: 2024-07-10
Attending: EMERGENCY MEDICINE | Admitting: SURGERY
Payer: COMMERCIAL

## 2024-07-08 DIAGNOSIS — K57.20 DIVERTICULITIS OF LARGE INTESTINE WITH PERFORATION WITHOUT ABSCESS OR BLEEDING: Primary | ICD-10-CM

## 2024-07-08 DIAGNOSIS — A41.9 SEPSIS (HCC): ICD-10-CM

## 2024-07-08 LAB
ALBUMIN SERPL BCG-MCNC: 4.7 G/DL (ref 3.5–5)
ALP SERPL-CCNC: 65 U/L (ref 34–104)
ALT SERPL W P-5'-P-CCNC: 21 U/L (ref 7–52)
ANION GAP SERPL CALCULATED.3IONS-SCNC: 9 MMOL/L (ref 4–13)
APTT PPP: 36 SECONDS (ref 23–37)
AST SERPL W P-5'-P-CCNC: 13 U/L (ref 13–39)
BACTERIA UR QL AUTO: ABNORMAL /HPF
BASOPHILS # BLD AUTO: 0.07 THOUSANDS/ÂΜL (ref 0–0.1)
BASOPHILS NFR BLD AUTO: 1 % (ref 0–1)
BILIRUB SERPL-MCNC: 1.32 MG/DL (ref 0.2–1)
BILIRUB UR QL STRIP: NEGATIVE
BUN SERPL-MCNC: 7 MG/DL (ref 5–25)
CALCIUM SERPL-MCNC: 9.9 MG/DL (ref 8.4–10.2)
CHLORIDE SERPL-SCNC: 101 MMOL/L (ref 96–108)
CLARITY UR: CLEAR
CO2 SERPL-SCNC: 27 MMOL/L (ref 21–32)
COLOR UR: YELLOW
CREAT SERPL-MCNC: 1.01 MG/DL (ref 0.6–1.3)
EOSINOPHIL # BLD AUTO: 0.07 THOUSAND/ÂΜL (ref 0–0.61)
EOSINOPHIL NFR BLD AUTO: 1 % (ref 0–6)
ERYTHROCYTE [DISTWIDTH] IN BLOOD BY AUTOMATED COUNT: 12.3 % (ref 11.6–15.1)
GFR SERPL CREATININE-BSD FRML MDRD: 93 ML/MIN/1.73SQ M
GLUCOSE SERPL-MCNC: 108 MG/DL (ref 65–140)
GLUCOSE UR STRIP-MCNC: NEGATIVE MG/DL
HCT VFR BLD AUTO: 43.9 % (ref 36.5–49.3)
HGB BLD-MCNC: 14.5 G/DL (ref 12–17)
HGB UR QL STRIP.AUTO: NEGATIVE
IMM GRANULOCYTES # BLD AUTO: 0.08 THOUSAND/UL (ref 0–0.2)
IMM GRANULOCYTES NFR BLD AUTO: 1 % (ref 0–2)
INR PPP: 1.06 (ref 0.84–1.19)
KETONES UR STRIP-MCNC: NEGATIVE MG/DL
LACTATE SERPL-SCNC: 0.7 MMOL/L (ref 0.5–2)
LEUKOCYTE ESTERASE UR QL STRIP: NEGATIVE
LIPASE SERPL-CCNC: 13 U/L (ref 11–82)
LYMPHOCYTES # BLD AUTO: 2.28 THOUSANDS/ÂΜL (ref 0.6–4.47)
LYMPHOCYTES NFR BLD AUTO: 16 % (ref 14–44)
MCH RBC QN AUTO: 27.2 PG (ref 26.8–34.3)
MCHC RBC AUTO-ENTMCNC: 33 G/DL (ref 31.4–37.4)
MCV RBC AUTO: 82 FL (ref 82–98)
MONOCYTES # BLD AUTO: 1.34 THOUSAND/ÂΜL (ref 0.17–1.22)
MONOCYTES NFR BLD AUTO: 10 % (ref 4–12)
MUCOUS THREADS UR QL AUTO: ABNORMAL
NEUTROPHILS # BLD AUTO: 10.1 THOUSANDS/ÂΜL (ref 1.85–7.62)
NEUTS SEG NFR BLD AUTO: 71 % (ref 43–75)
NITRITE UR QL STRIP: NEGATIVE
NON-SQ EPI CELLS URNS QL MICRO: ABNORMAL /HPF
NRBC BLD AUTO-RTO: 0 /100 WBCS
PH UR STRIP.AUTO: 6 [PH]
PLATELET # BLD AUTO: 340 THOUSANDS/UL (ref 149–390)
PMV BLD AUTO: 12.1 FL (ref 8.9–12.7)
POTASSIUM SERPL-SCNC: 4 MMOL/L (ref 3.5–5.3)
PROCALCITONIN SERPL-MCNC: 0.17 NG/ML
PROT SERPL-MCNC: 8.4 G/DL (ref 6.4–8.4)
PROT UR STRIP-MCNC: ABNORMAL MG/DL
PROTHROMBIN TIME: 14.4 SECONDS (ref 11.6–14.5)
RBC # BLD AUTO: 5.34 MILLION/UL (ref 3.88–5.62)
RBC #/AREA URNS AUTO: ABNORMAL /HPF
SODIUM SERPL-SCNC: 137 MMOL/L (ref 135–147)
SP GR UR STRIP.AUTO: 1.02 (ref 1–1.03)
UROBILINOGEN UR STRIP-ACNC: <2 MG/DL
WBC # BLD AUTO: 13.94 THOUSAND/UL (ref 4.31–10.16)
WBC #/AREA URNS AUTO: ABNORMAL /HPF

## 2024-07-08 PROCEDURE — 85730 THROMBOPLASTIN TIME PARTIAL: CPT | Performed by: EMERGENCY MEDICINE

## 2024-07-08 PROCEDURE — 81001 URINALYSIS AUTO W/SCOPE: CPT

## 2024-07-08 PROCEDURE — 80053 COMPREHEN METABOLIC PANEL: CPT

## 2024-07-08 PROCEDURE — 85025 COMPLETE CBC W/AUTO DIFF WBC: CPT

## 2024-07-08 PROCEDURE — 84145 PROCALCITONIN (PCT): CPT

## 2024-07-08 PROCEDURE — 99291 CRITICAL CARE FIRST HOUR: CPT | Performed by: EMERGENCY MEDICINE

## 2024-07-08 PROCEDURE — 36415 COLL VENOUS BLD VENIPUNCTURE: CPT

## 2024-07-08 PROCEDURE — 87040 BLOOD CULTURE FOR BACTERIA: CPT

## 2024-07-08 PROCEDURE — 83605 ASSAY OF LACTIC ACID: CPT

## 2024-07-08 PROCEDURE — 96365 THER/PROPH/DIAG IV INF INIT: CPT

## 2024-07-08 PROCEDURE — 85610 PROTHROMBIN TIME: CPT | Performed by: EMERGENCY MEDICINE

## 2024-07-08 PROCEDURE — 99223 1ST HOSP IP/OBS HIGH 75: CPT | Performed by: SURGERY

## 2024-07-08 PROCEDURE — 93005 ELECTROCARDIOGRAM TRACING: CPT

## 2024-07-08 PROCEDURE — 96375 TX/PRO/DX INJ NEW DRUG ADDON: CPT

## 2024-07-08 PROCEDURE — 99285 EMERGENCY DEPT VISIT HI MDM: CPT

## 2024-07-08 PROCEDURE — 83690 ASSAY OF LIPASE: CPT

## 2024-07-08 PROCEDURE — 74177 CT ABD & PELVIS W/CONTRAST: CPT

## 2024-07-08 RX ORDER — ACETAMINOPHEN 325 MG/1
975 TABLET ORAL ONCE
Status: DISCONTINUED | OUTPATIENT
Start: 2024-07-08 | End: 2024-07-08

## 2024-07-08 RX ORDER — ONDANSETRON 2 MG/ML
4 INJECTION INTRAMUSCULAR; INTRAVENOUS ONCE
Status: COMPLETED | OUTPATIENT
Start: 2024-07-08 | End: 2024-07-08

## 2024-07-08 RX ORDER — ALBUTEROL SULFATE 90 UG/1
2 AEROSOL, METERED RESPIRATORY (INHALATION) EVERY 6 HOURS PRN
Status: DISCONTINUED | OUTPATIENT
Start: 2024-07-08 | End: 2024-07-10 | Stop reason: HOSPADM

## 2024-07-08 RX ORDER — SODIUM CHLORIDE, SODIUM LACTATE, POTASSIUM CHLORIDE, CALCIUM CHLORIDE 600; 310; 30; 20 MG/100ML; MG/100ML; MG/100ML; MG/100ML
100 INJECTION, SOLUTION INTRAVENOUS CONTINUOUS
Status: DISCONTINUED | OUTPATIENT
Start: 2024-07-08 | End: 2024-07-09

## 2024-07-08 RX ORDER — ENOXAPARIN SODIUM 100 MG/ML
40 INJECTION SUBCUTANEOUS DAILY
Status: DISCONTINUED | OUTPATIENT
Start: 2024-07-08 | End: 2024-07-10 | Stop reason: HOSPADM

## 2024-07-08 RX ORDER — ACETAMINOPHEN 10 MG/ML
1000 INJECTION, SOLUTION INTRAVENOUS ONCE
Status: COMPLETED | OUTPATIENT
Start: 2024-07-08 | End: 2024-07-08

## 2024-07-08 RX ORDER — SODIUM CHLORIDE, SODIUM LACTATE, POTASSIUM CHLORIDE, CALCIUM CHLORIDE 600; 310; 30; 20 MG/100ML; MG/100ML; MG/100ML; MG/100ML
125 INJECTION, SOLUTION INTRAVENOUS CONTINUOUS
Status: DISCONTINUED | OUTPATIENT
Start: 2024-07-08 | End: 2024-07-08

## 2024-07-08 RX ORDER — HYDROMORPHONE HCL/PF 1 MG/ML
0.5 SYRINGE (ML) INJECTION
Status: DISCONTINUED | OUTPATIENT
Start: 2024-07-08 | End: 2024-07-10 | Stop reason: HOSPADM

## 2024-07-08 RX ORDER — ACETAMINOPHEN 325 MG/1
975 TABLET ORAL EVERY 6 HOURS PRN
Status: DISCONTINUED | OUTPATIENT
Start: 2024-07-08 | End: 2024-07-10 | Stop reason: HOSPADM

## 2024-07-08 RX ORDER — OXYCODONE HYDROCHLORIDE 10 MG/1
10 TABLET ORAL EVERY 4 HOURS PRN
Status: DISCONTINUED | OUTPATIENT
Start: 2024-07-08 | End: 2024-07-10 | Stop reason: HOSPADM

## 2024-07-08 RX ORDER — NICOTINE 21 MG/24HR
1 PATCH, TRANSDERMAL 24 HOURS TRANSDERMAL DAILY
Status: DISCONTINUED | OUTPATIENT
Start: 2024-07-08 | End: 2024-07-10 | Stop reason: HOSPADM

## 2024-07-08 RX ORDER — ONDANSETRON 2 MG/ML
4 INJECTION INTRAMUSCULAR; INTRAVENOUS EVERY 4 HOURS PRN
Status: DISCONTINUED | OUTPATIENT
Start: 2024-07-08 | End: 2024-07-10 | Stop reason: HOSPADM

## 2024-07-08 RX ORDER — OXYCODONE HYDROCHLORIDE 5 MG/1
5 TABLET ORAL EVERY 4 HOURS PRN
Status: DISCONTINUED | OUTPATIENT
Start: 2024-07-08 | End: 2024-07-10 | Stop reason: HOSPADM

## 2024-07-08 RX ORDER — ONDANSETRON 2 MG/ML
4 INJECTION INTRAMUSCULAR; INTRAVENOUS EVERY 4 HOURS PRN
Status: DISCONTINUED | OUTPATIENT
Start: 2024-07-08 | End: 2024-07-08

## 2024-07-08 RX ADMIN — IOHEXOL 100 ML: 350 INJECTION, SOLUTION INTRAVENOUS at 08:55

## 2024-07-08 RX ADMIN — ONDANSETRON 4 MG: 2 INJECTION INTRAMUSCULAR; INTRAVENOUS at 11:48

## 2024-07-08 RX ADMIN — PIPERACILLIN SODIUM AND TAZOBACTAM SODIUM 4.5 G: 36; 4.5 INJECTION, POWDER, LYOPHILIZED, FOR SOLUTION INTRAVENOUS at 22:10

## 2024-07-08 RX ADMIN — ACETAMINOPHEN 1000 MG: 10 INJECTION INTRAVENOUS at 11:50

## 2024-07-08 RX ADMIN — TRIMETHOBENZAMIDE HYDROCHLORIDE 200 MG: 100 INJECTION INTRAMUSCULAR at 15:17

## 2024-07-08 RX ADMIN — SODIUM CHLORIDE, SODIUM LACTATE, POTASSIUM CHLORIDE, AND CALCIUM CHLORIDE 100 ML/HR: .6; .31; .03; .02 INJECTION, SOLUTION INTRAVENOUS at 12:23

## 2024-07-08 RX ADMIN — SODIUM CHLORIDE 1000 ML: 0.9 INJECTION, SOLUTION INTRAVENOUS at 07:59

## 2024-07-08 RX ADMIN — HYDROMORPHONE HYDROCHLORIDE 0.5 MG: 1 INJECTION, SOLUTION INTRAMUSCULAR; INTRAVENOUS; SUBCUTANEOUS at 15:18

## 2024-07-08 RX ADMIN — PIPERACILLIN SODIUM AND TAZOBACTAM SODIUM 4.5 G: 36; 4.5 INJECTION, POWDER, LYOPHILIZED, FOR SOLUTION INTRAVENOUS at 12:23

## 2024-07-08 RX ADMIN — PIPERACILLIN AND TAZOBACTAM 4.5 G: 4; .5 INJECTION, POWDER, FOR SOLUTION INTRAVENOUS at 08:02

## 2024-07-08 NOTE — ED ATTENDING ATTESTATION
7/8/2024  I, Ry Hutton MD, saw and evaluated the patient. I have discussed the patient with the resident/non-physician practitioner and agree with the resident's/non-physician practitioner's findings, Plan of Care, and MDM as documented in the resident's/non-physician practitioner's note, except where noted. All available labs and Radiology studies were reviewed.  I was present for key portions of any procedure(s) performed by the resident/non-physician practitioner and I was immediately available to provide assistance.       At this point I agree with the current assessment done in the Emergency Department.  I have conducted an independent evaluation of this patient a history and physical is as follows:    History    Patient is a 39-year-old male, with a history significant for recent admission for diverticulitis complicated by perforation on 6/21/2024, who presents to the ED today for evaluation of a 2-day history of atraumatic, intermittent, crampy in character, nonradiating, right lower quadrant abdominal pain that feels similar to his diverticulitis.  Patient reports associated fever up to 102.  Patient has taken Tylenol, 1 g daily, to remit his symptoms.  There is no prandial component to the pain but patient states that hitting bumps on the road in his car exacerbates his discomfort.  There is no associated chest pain, dyspnea, urinary symptoms, rash, weakness, numbness.    Patient is without other concerns at this time.     ROS  Patient denies: Fever; dysphagia; vision change; chest pain; dyspnea; polyuria; dysuria; rash; weakness; numbness; difficulty walking; confusion    Physical Exam    GENERAL APPEARANCE: Non-toxic appearing   NEURO: Patient is speaking clearly in complete sentences.  Patient is answering appropriately and able follow commands.  Patient is moving all four extremities spontaneously.  No facial droop.  Tongue midline.  HEENT: PERRL, Moist mucous membranes, external ears  normal, nose normal  Neck: No cervical adenopathy  CV: RRR. No murmurs, rubs, gallops  LUNGS: Clear to auscultation: No wheezes, stridor, rhonchi, rales  GI: Abdomen non-distended. Soft.  Tenderness to palpation in the right lower quadrant with mild guarding.  : Deferred at this time  MSK: No deformity.   Skin: Warm and dry  Capillary refill: <2 seconds    Patient is currently afebrile, tachycardic, otherwise stable.    Assessment/Plan/MDM  Abdominal pain, fever  -This presentation is concerning for: Colitis, FIORDALIZA, electrolyte abnormality, ureterolithiasis.  Patient at risk for abscess, diverticulitis, appendicitis  -Will investigate with sepsis panel order set, CT abdomen pelvis to  -Will manage with fluids and further based upon workup as patient does not desire analgesia at this time    ED Course  ED Course as of 07/08/24 1720   Mon Jul 08, 2024   0748 WBC(!): 13.94  High  patient meets sirs - Abx ordered    0800 ECG per my independent interpretation: Tachcyardic rate, regular rhythm, no ectopy, rightward axis, no ST elevations or depressions     0808 Blood culture #1   0817 Bacteria, UA: None Seen  WNL   0829 POCT INR: 1.06  WNL   0852 LACTIC ACID: 0.7  WNL   1048 Given results as well as recurrence of symptoms, general surgery consulted and patient was accepted for admission under service         Critical Care Time  CriticalCare Time    Date/Time: 7/8/2024 5:18 PM    Performed by: Ry Hutton MD  Authorized by: Ry Hutton MD    Critical care provider statement:     Critical care time (minutes):  38    Critical care start time:  7/8/2024 7:00 AM    Critical care end time:  7/8/2024 7:38 AM    Critical care time was exclusive of:  Separately billable procedures and treating other patients and teaching time    Critical care was necessary to treat or prevent imminent or life-threatening deterioration of the following conditions:  Sepsis    Critical care was time spent personally by me on the  following activities:  Obtaining history from patient or surrogate, discussions with consultants, evaluation of patient's response to treatment, examination of patient, ordering and performing treatments and interventions, ordering and review of laboratory studies, ordering and review of radiographic studies, re-evaluation of patient's condition and review of old charts    I assumed direction of critical care for this patient from another provider in my specialty: yes

## 2024-07-08 NOTE — SEPSIS NOTE
Sepsis Note   Grant Shannon 39 y.o. male MRN: [id number]  Unit/Bed#: ED-01 Encounter: [id number]       Initial Sepsis Screening       Row Name 07/08/24 0853                Is the patient's history suggestive of a new or worsening infection? Yes (Proceed)  -CL        Suspected source of infection acute abdominal infection  -CL        Indicate SIRS criteria Tachycardia > 90 bpm;Leukocytosis (WBC > 69017 IJL) OR Leukopenia (WBC <4000 IJL) OR Bandemia (WBC >10% bands)  -CL        Are two or more of the above signs & symptoms of infection both present and new to the patient? Yes (Proceed)  -CL        Assess for evidence of organ dysfunction: Are any of the below criteria present within 6 hours of suspected infection and SIRS criteria that are NOT considered to be chronic conditions? --                  User Key  (r) = Recorded By, (t) = Taken By, (c) = Cosigned By      Initials Name Provider Type    CL Ry Hutton MD Physician                        There is no height or weight on file to calculate BMI.  Wt Readings from Last 1 Encounters:   06/21/24 114 kg (251 lb 15.8 oz)        Ideal body weight: 68.4 kg (150 lb 12.7 oz)  Adjusted ideal body weight: 86.8 kg (191 lb 4.3 oz)

## 2024-07-08 NOTE — ED PROVIDER NOTES
History  Chief Complaint   Patient presents with    Abdominal Pain     Pt reports being seen two weeks ago and diagnosed with diverticulitis. Pt reports two days intermittent RLQ pain along with fevers tmax 102.      HPI    39-year-old male presents for evaluation of fever and right lower quadrant abdominal pain.  Onset yesterday evening, when he reports fever up to 102.7, and intermittent, sharp RLQ abdominal pain. Pain exacerbated by eating or drinking fluids. Fever responsive to Tylenol.  He reports regular bowel movements, with no diarrhea or bloody stools.  Sepsis labs, UA ordered of note, he was seen in the ED two weeks ago for similar but more severe symptoms, and diagnosed with diverticulitis with small bowel perforation. He was treated conservatively, and  and discharged on antibiotics. Symptoms had resolved prior to this episode yesterday. Denies nausea, vomiting, chest pain, SOB.     Prior to Admission Medications   Prescriptions Last Dose Informant Patient Reported? Taking?   albuterol (PROVENTIL HFA,VENTOLIN HFA) 90 mcg/act inhaler   No No   Sig: Inhale 2 puffs every 6 (six) hours as needed for wheezing or shortness of breath   benzonatate (TESSALON PERLES) 100 mg capsule   No No   Sig: Take 1 capsule (100 mg total) by mouth 3 (three) times a day as needed for cough   fluticasone (FLONASE) 50 mcg/act nasal spray   No No   Si spray into each nostril daily      Facility-Administered Medications: None       Past Medical History:   Diagnosis Date    Diverticulitis        History reviewed. No pertinent surgical history.    History reviewed. No pertinent family history.  I have reviewed and agree with the history as documented.    E-Cigarette/Vaping     E-Cigarette/Vaping Substances     Social History     Tobacco Use    Smoking status: Never    Smokeless tobacco: Current   Substance Use Topics    Alcohol use: Never    Drug use: Never        Review of Systems   Constitutional:  Positive for fever. Negative  for chills.   Respiratory:  Negative for cough and shortness of breath.    Cardiovascular:  Negative for chest pain.   Gastrointestinal:  Positive for abdominal pain. Negative for diarrhea, nausea and vomiting.   Skin:  Negative for color change.   Neurological:  Negative for dizziness and headaches.   All other systems reviewed and are negative.      Physical Exam  ED Triage Vitals   Temperature Pulse Respirations Blood Pressure SpO2   07/08/24 0655 07/08/24 0655 07/08/24 0655 07/08/24 0655 07/08/24 0655   98 °F (36.7 °C) (!) 120 18 132/83 97 %      Temp Source Heart Rate Source Patient Position - Orthostatic VS BP Location FiO2 (%)   07/08/24 0655 07/08/24 0655 07/08/24 0655 07/08/24 0655 --   Oral Monitor Sitting Right arm       Pain Score       07/08/24 1120       7             Orthostatic Vital Signs  Vitals:    07/09/24 0737 07/09/24 1513 07/09/24 2214 07/10/24 0717   BP: 111/76 125/75 130/85 137/81   Pulse: 92 83 76 79   Patient Position - Orthostatic VS:           Physical Exam  Vitals and nursing note reviewed.   Constitutional:       General: He is not in acute distress.     Appearance: He is well-developed.   HENT:      Head: Normocephalic and atraumatic.   Eyes:      Conjunctiva/sclera: Conjunctivae normal.   Cardiovascular:      Rate and Rhythm: Normal rate and regular rhythm.      Heart sounds: No murmur heard.  Pulmonary:      Effort: Pulmonary effort is normal. No respiratory distress.      Breath sounds: Normal breath sounds.   Abdominal:      Palpations: Abdomen is soft.      Tenderness: There is no abdominal tenderness. There is no guarding.   Musculoskeletal:         General: No swelling.   Skin:     General: Skin is warm and dry.      Capillary Refill: Capillary refill takes less than 2 seconds.   Neurological:      Mental Status: He is alert.         ED Medications  Medications   enoxaparin (LOVENOX) subcutaneous injection 40 mg (40 mg Subcutaneous Not Given 7/9/24 0843)   nicotine (NICODERM  CQ) 14 mg/24hr TD 24 hr patch 1 patch (1 patch Transdermal Not Given 7/9/24 0842)   acetaminophen (TYLENOL) tablet 975 mg (has no administration in time range)   oxyCODONE (ROXICODONE) immediate release tablet 10 mg (has no administration in time range)   oxyCODONE (ROXICODONE) IR tablet 5 mg (has no administration in time range)   HYDROmorphone (DILAUDID) injection 0.5 mg (0.5 mg Intravenous Given 7/8/24 1518)   albuterol (PROVENTIL HFA,VENTOLIN HFA) inhaler 2 puff (has no administration in time range)   piperacillin-tazobactam (ZOSYN) IVPB (EXTENDED INFUSION) 4.5 g (4.5 g Intravenous Given 7/10/24 0539)   trimethobenzamide (TIGAN) IM injection 200 mg (200 mg Intramuscular Given 7/8/24 1517)   ondansetron (ZOFRAN) injection 4 mg (has no administration in time range)   piperacillin-tazobactam (ZOSYN) IVPB 4.5 g (0 g Intravenous Stopped 7/8/24 0832)   sodium chloride 0.9 % bolus 1,000 mL (0 mL Intravenous Stopped 7/8/24 0829)   iohexol (OMNIPAQUE) 350 MG/ML injection (MULTI-DOSE) 100 mL (100 mL Intravenous Given 7/8/24 0855)   acetaminophen (Ofirmev) injection 1,000 mg (0 mg Intravenous Stopped 7/8/24 1205)   ondansetron (ZOFRAN) injection 4 mg (4 mg Intravenous Given 7/8/24 1148)       Diagnostic Studies  Results Reviewed       Procedure Component Value Units Date/Time    Blood culture #1 [[id number]] Collected: 07/08/24 0755    Lab Status: Preliminary result Specimen: Blood from Hand, Right Updated: 07/09/24 1401     Blood Culture No Growth at 24 hrs.    Blood culture #2 [[id number]] Collected: 07/08/24 0748    Lab Status: Preliminary result Specimen: Blood from Arm, Right Updated: 07/09/24 1401     Blood Culture No Growth at 24 hrs.    Basic metabolic panel [[id number]]  (Abnormal) Collected: 07/09/24 0631    Lab Status: Final result Specimen: Blood from Hand, Right Updated: 07/09/24 0854     Sodium 137 mmol/L      Potassium 3.4 mmol/L      Chloride 104 mmol/L      CO2 26 mmol/L      ANION GAP 7 mmol/L      BUN 10  mg/dL      Creatinine 0.86 mg/dL      Glucose 88 mg/dL      Calcium 8.5 mg/dL      eGFR 109 ml/min/1.73sq m     Narrative:      National Kidney Disease Foundation guidelines for Chronic Kidney Disease (CKD):     Stage 1 with normal or high GFR (GFR > 90 mL/min/1.73 square meters)    Stage 2 Mild CKD (GFR = 60-89 mL/min/1.73 square meters)    Stage 3A Moderate CKD (GFR = 45-59 mL/min/1.73 square meters)    Stage 3B Moderate CKD (GFR = 30-44 mL/min/1.73 square meters)    Stage 4 Severe CKD (GFR = 15-29 mL/min/1.73 square meters)    Stage 5 End Stage CKD (GFR <15 mL/min/1.73 square meters)  Note: GFR calculation is accurate only with a steady state creatinine    Magnesium [[id number]]  (Normal) Collected: 07/09/24 0631    Lab Status: Final result Specimen: Blood from Hand, Right Updated: 07/09/24 0854     Magnesium 2.1 mg/dL     Phosphorus [[id number]]  (Abnormal) Collected: 07/09/24 0631    Lab Status: Final result Specimen: Blood from Hand, Right Updated: 07/09/24 0854     Phosphorus 2.4 mg/dL     CBC and differential [[id number]]  (Abnormal) Collected: 07/09/24 0631    Lab Status: Final result Specimen: Blood from Hand, Right Updated: 07/09/24 0735     WBC 9.32 Thousand/uL      RBC 4.58 Million/uL      Hemoglobin 12.3 g/dL      Hematocrit 37.7 %      MCV 82 fL      MCH 26.9 pg      MCHC 32.6 g/dL      RDW 12.6 %      MPV 12.8 fL      Platelets 285 Thousands/uL      nRBC 0 /100 WBCs      Segmented % 67 %      Immature Grans % 1 %      Lymphocytes % 19 %      Monocytes % 10 %      Eosinophils Relative 2 %      Basophils Relative 1 %      Absolute Neutrophils 6.35 Thousands/µL      Absolute Immature Grans 0.06 Thousand/uL      Absolute Lymphocytes 1.75 Thousands/µL      Absolute Monocytes 0.96 Thousand/µL      Eosinophils Absolute 0.15 Thousand/µL      Basophils Absolute 0.05 Thousands/µL     Procalcitonin [[id number]]  (Normal) Collected: 07/08/24 0734    Lab Status: Final result Specimen: Blood from Arm, Right Updated:  07/08/24 1108     Procalcitonin 0.17 ng/ml     Lactic acid [[id number]]  (Normal) Collected: 07/08/24 0748    Lab Status: Final result Specimen: Blood from Arm, Right Updated: 07/08/24 0850     LACTIC ACID 0.7 mmol/L     Narrative:      Result may be elevated if tourniquet was used during collection.    APTT [[id number]]  (Normal) Collected: 07/08/24 0749    Lab Status: Final result Specimen: Blood from Arm, Right Updated: 07/08/24 0825     PTT 36 seconds     Protime-INR [[id number]]  (Normal) Collected: 07/08/24 0749    Lab Status: Final result Specimen: Blood from Arm, Right Updated: 07/08/24 0825     Protime 14.4 seconds      INR 1.06    Urine Microscopic [[id number]]  (Abnormal) Collected: 07/08/24 0737    Lab Status: Final result Specimen: Urine, Clean Catch Updated: 07/08/24 0811     RBC, UA 1-2 /hpf      WBC, UA 4-10 /hpf      Epithelial Cells Occasional /hpf      Bacteria, UA None Seen /hpf      MUCUS THREADS Occasional    Comprehensive metabolic panel [[id number]]  (Abnormal) Collected: 07/08/24 0734    Lab Status: Final result Specimen: Blood from Arm, Right Updated: 07/08/24 0804     Sodium 137 mmol/L      Potassium 4.0 mmol/L      Chloride 101 mmol/L      CO2 27 mmol/L      ANION GAP 9 mmol/L      BUN 7 mg/dL      Creatinine 1.01 mg/dL      Glucose 108 mg/dL      Calcium 9.9 mg/dL      AST 13 U/L      ALT 21 U/L      Alkaline Phosphatase 65 U/L      Total Protein 8.4 g/dL      Albumin 4.7 g/dL      Total Bilirubin 1.32 mg/dL      eGFR 93 ml/min/1.73sq m     Narrative:      National Kidney Disease Foundation guidelines for Chronic Kidney Disease (CKD):     Stage 1 with normal or high GFR (GFR > 90 mL/min/1.73 square meters)    Stage 2 Mild CKD (GFR = 60-89 mL/min/1.73 square meters)    Stage 3A Moderate CKD (GFR = 45-59 mL/min/1.73 square meters)    Stage 3B Moderate CKD (GFR = 30-44 mL/min/1.73 square meters)    Stage 4 Severe CKD (GFR = 15-29 mL/min/1.73 square meters)    Stage 5 End Stage CKD (GFR <15  mL/min/1.73 square meters)  Note: GFR calculation is accurate only with a steady state creatinine    Lipase [[id number]]  (Normal) Collected: 07/08/24 0734    Lab Status: Final result Specimen: Blood from Arm, Right Updated: 07/08/24 0804     Lipase 13 u/L     UA w Reflex to Microscopic w Reflex to Culture [[id number]]  (Abnormal) Collected: 07/08/24 0737    Lab Status: Final result Specimen: Urine, Clean Catch Updated: 07/08/24 0756     Color, UA Yellow     Clarity, UA Clear     Specific Gravity, UA 1.016     pH, UA 6.0     Leukocytes, UA Negative     Nitrite, UA Negative     Protein, UA Trace mg/dl      Glucose, UA Negative mg/dl      Ketones, UA Negative mg/dl      Urobilinogen, UA <2.0 mg/dl      Bilirubin, UA Negative     Occult Blood, UA Negative    CBC and differential [[id number]]  (Abnormal) Collected: 07/08/24 0734    Lab Status: Final result Specimen: Blood from Arm, Right Updated: 07/08/24 0747     WBC 13.94 Thousand/uL      RBC 5.34 Million/uL      Hemoglobin 14.5 g/dL      Hematocrit 43.9 %      MCV 82 fL      MCH 27.2 pg      MCHC 33.0 g/dL      RDW 12.3 %      MPV 12.1 fL      Platelets 340 Thousands/uL      nRBC 0 /100 WBCs      Segmented % 71 %      Immature Grans % 1 %      Lymphocytes % 16 %      Monocytes % 10 %      Eosinophils Relative 1 %      Basophils Relative 1 %      Absolute Neutrophils 10.10 Thousands/µL      Absolute Immature Grans 0.08 Thousand/uL      Absolute Lymphocytes 2.28 Thousands/µL      Absolute Monocytes 1.34 Thousand/µL      Eosinophils Absolute 0.07 Thousand/µL      Basophils Absolute 0.07 Thousands/µL                    CT abdomen pelvis with contrast   Final Result by Augusto Cota MD (07/08 0954)      Persistent proximal to mid sigmoid diverticulitis with decreased size of contained perforation compared to 6/23/2024. The contained fluid collection now measures 1.7 x 0.9 cm and no longer contains gas. No new or enlarging intra-abdominal collection.      Similar  small mural thickening of the adjacent small bowel, likely reactive enteritis.      Unchanged hepatomegaly and hepatic steatosis with subcentimeter hyperattenuating lesions at the right hepatic dome and left hepatic lobe. See CT of the abdomen and pelvis 6/23/2024 for follow-up recommendations.         Workstation performed: [id number]               Procedures  ECG 12 Lead Documentation Only    Date/Time: 7/8/2024 8:22 AM    Performed by: Maurizio Roque MD  Authorized by: Maurizio Roque MD    Rate:     ECG rate:  101    ECG rate assessment: tachycardic    Rhythm:     Rhythm: sinus tachycardia    Ectopy:     Ectopy: none    QRS:     QRS axis:  Right    QRS intervals:  Normal  Comments:      Sinus tachycardia, 101 bpm, right axis deviation.  L 44 ms, QRS 78 ms, QTc 435 ms.  No ST elevations or ischemic changes noted.        ED Course                          Initial Sepsis Screening       Row Name 07/08/24 0853                Is the patient's history suggestive of a new or worsening infection? Yes (Proceed)  -CL        Suspected source of infection acute abdominal infection  -CL        Indicate SIRS criteria Tachycardia > 90 bpm;Leukocytosis (WBC > 99384 IJL) OR Leukopenia (WBC <4000 IJL) OR Bandemia (WBC >10% bands)  -CL        Are two or more of the above signs & symptoms of infection both present and new to the patient? Yes (Proceed)  -CL        Assess for evidence of organ dysfunction: Are any of the below criteria present within 6 hours of suspected infection and SIRS criteria that are NOT considered to be chronic conditions? --                  User Key  (r) = Recorded By, (t) = Taken By, (c) = Cosigned By      Initials Name Provider Type    CL Ry Hutton MD Physician                    SBIRT 22yo+      Flowsheet Row Most Recent Value   Initial Alcohol Screen: US AUDIT-C     1. How often do you have a drink containing alcohol? 0 Filed at: 07/08/2024 2032   2. How many drinks containing  alcohol do you have on a typical day you are drinking?  0 Filed at: 07/08/2024 2032   3a. Male UNDER 65: How often do you have five or more drinks on one occasion? 0 Filed at: 07/08/2024 2032   3b. FEMALE Any Age, or MALE 65+: How often do you have 4 or more drinks on one occassion? 0 Filed at: 07/08/2024 2032   Audit-C Score 0 Filed at: 07/08/2024 2032   ANGELES: How many times in the past year have you...    Used an illegal drug or used a prescription medication for non-medical reasons? Never Filed at: 07/08/2024 2032                  Medical Decision Making  39-year-old male presents for evaluation of right lower quadrant abdominal pain.    On initial evaluation, patient in no acute distress, resting comfortably in bed.  Mildly tachycardic on initial eval, but vitals otherwise stable.  Physical exam shows soft, nontender abdomen.  Patient currently denies any pain.  Given most recent history of diverticulitis with bowel perforation, there is concern for intra-abdominal abscess formation vs recurrent diverticulitis.  Other differentials include appendicitis, gastroenteritis, gastritis, SBO.  Sepsis labs, CT A/P, UA ordered. IVF also given. Pt meets sepsis criteria with WBC 13.94, tachycardia and suspected source. Zosyn also started. CT showed persistent diverticulitis with improvement in perforation. General surgery service was consulted, and after eval, pt was admitted to Surgery service for further management. Tylenol and Zofran were given during ED stay for symptomatic management.     Amount and/or Complexity of Data Reviewed  Labs: ordered.  Radiology: ordered.    Risk  Prescription drug management.          Disposition  Final diagnoses:   Diverticulitis of large intestine with perforation without abscess or bleeding   Sepsis (HCC)     Time reflects when diagnosis was documented in both MDM as applicable and the Disposition within this note       Time User Action Codes Description Comment    7/8/2024 10:18 AM  Maurizio Roque Add [K57.20] Diverticulitis of large intestine with perforation without abscess or bleeding     7/8/2024  5:19 PM Ry Hutton Add [A41.9] Sepsis (HCC)           ED Disposition       None          Follow-up Information    None         Current Discharge Medication List        CONTINUE these medications which have NOT CHANGED    Details   albuterol (PROVENTIL HFA,VENTOLIN HFA) 90 mcg/act inhaler Inhale 2 puffs every 6 (six) hours as needed for wheezing or shortness of breath  Qty: 1 Inhaler, Refills: 0    Associated Diagnoses: Acute bronchitis, unspecified organism      benzonatate (TESSALON PERLES) 100 mg capsule Take 1 capsule (100 mg total) by mouth 3 (three) times a day as needed for cough  Qty: 30 capsule, Refills: 0    Associated Diagnoses: Acute bronchitis, unspecified organism      fluticasone (FLONASE) 50 mcg/act nasal spray 1 spray into each nostril daily  Qty: 1 Bottle, Refills: 0    Associated Diagnoses: Acute bronchitis, unspecified organism           No discharge procedures on file.    PDMP Review       None             ED Provider  Attending physically available and evaluated Grant Shannon. I managed the patient along with the ED Attending.    Electronically Signed by           Maurizio Roque MD  07/10/24 2528

## 2024-07-08 NOTE — CASE MANAGEMENT
Case Management Assessment & Discharge Planning Note    Patient name Grant Shannon  Location ED-/ED- MRN [id number]  : 1985 Date 2024       Current Admission Date: 2024  Current Admission Diagnosis:  Patient Active Problem List    Diagnosis Date Noted Date Diagnosed    Diverticulitis of large intestine with perforation without abscess or bleeding 2024     Sepsis without acute organ dysfunction (HCC) 2024     Acute bronchitis 2018       LOS (days): 0  Geometric Mean LOS (GMLOS) (days):   Days to GMLOS:     OBJECTIVE:  PATIENT READMITTED TO HOSPITAL  Risk of Unplanned Readmission Score: 8.86         Current admission status: Inpatient       Preferred Pharmacy:   [facility] #[id number], [address] [place] [address]  Phone: [phone] Fax: [phone]    [facility] - [address] [address]  Phone: [phone] Fax: [phone]    Primary Care Provider: Rubén Moore MD    Primary Insurance: BLUE CROSS  Secondary Insurance:     ASSESSMENT:  Active Health Care Proxies    There are no active Health Care Proxies on file.                 Readmission Root Cause  30 Day Readmission: Yes  Who directed you to return to the hospital?: Family, Self  Did you understand whom to contact if you had questions or problems?: Yes  Did you get your prescriptions before you left the hospital?: Yes  Were you able to get your prescriptions filled when you left the hospital?: Yes  Did you take your medications as prescribed?: Yes  Were you able to get to your follow-up appointments?: Yes  During previous admission, was a post-acute recommendation made?: No  Patient was readmitted due to: Abdominal Pain  Action Plan: Gen surg consult, CLD, DVT ppx    Patient Information  Admitted from:: Home  Mental Status: Alert  During Assessment patient was accompanied by: Spouse  Assessment information provided by::  Patient, Spouse  Primary Caregiver: Self  Support Systems: Self, Spouse/significant other  County of Residence: [facility]  What city do you live in?: [place]  Home entry access options. Select all that apply.: Stairs  Number of steps to enter home.: 3  Type of Current Residence: 2 story home  Upon entering residence, is there a bedroom on the main floor (no further steps)?: Yes  Upon entering residence, is there a bathroom on the main floor (no further steps)?: Yes  Living Arrangements: Lives w/ Spouse/significant other    Activities of Daily Living Prior to Admission  Functional Status: Independent  Completes ADLs independently?: Yes  Ambulates independently?: Yes  Does patient use assisted devices?: No  Does patient currently own DME?: No  Does patient have a history of Outpatient Therapy (PT/OT)?: No  Does the patient have a history of Short-Term Rehab?: No  Does patient have a history of HHC?: No  Does patient currently have HHC?: No         Patient Information Continued  Income Source: Employed  Does patient have prescription coverage?: Yes  Does patient receive dialysis treatments?: No  Does patient have a history of substance abuse?: No  Does patient have a history of Mental Health Diagnosis?: No    PHQ 2/9 Screening   Reviewed PHQ 2/9 Depression Screening Score?: No    Means of Transportation  Means of Transport to Appts:: Drives Self      Social Determinants of Health (SDOH)      Flowsheet Row Most Recent Value   Housing Stability    In the last 12 months, was there a time when you were not able to pay the mortgage or rent on time? N   In the past 12 months, how many times have you moved where you were living? 0   At any time in the past 12 months, were you homeless or living in a shelter (including now)? N   Transportation Needs    In the past 12 months, has lack of transportation kept you from medical appointments or from getting medications? no   In the past 12 months, has lack of transportation kept you  from meetings, work, or from getting things needed for daily living? No   Food Insecurity    Within the past 12 months, you worried that your food would run out before you got the money to buy more. Never true   Within the past 12 months, the food you bought just didn't last and you didn't have money to get more. Never true   Utilities    In the past 12 months has the electric, gas, oil, or water company threatened to shut off services in your home? No            DISCHARGE DETAILS:    Discharge planning discussed with:: Patient, spouse  Freedom of Choice: Yes  Comments - Freedom of Choice: Return home - no CM needs anticipated  CM contacted family/caregiver?: Yes (Spouse present at bedside)  Were Treatment Team discharge recommendations reviewed with patient/caregiver?: Yes  Did patient/caregiver verbalize understanding of patient care needs?: Yes  Were patient/caregiver advised of the risks associated with not following Treatment Team discharge recommendations?: Yes    Contacts  Patient Contacts: Ledy Burns  Relationship to Patient:: Other (Comment) (Self, spouse)  Contact Method: In Person  Reason/Outcome: Continuity of Care, Emergency Contact, Discharge Planning    Requested Home Health Care         Is the patient interested in HHC at discharge?: No    DME Referral Provided  Referral made for DME?: No    Other Referral/Resources/Interventions Provided:  Interventions: Other (Specify)  Referral Comments: CM spoke with pt and spouse at bedside, introduced self and role with discharge planning. Pt noted to be a 30 day readmission. Pt denied any issues with transportation to f/u appointments. Pt reported he took medications as prescribed, no issues with getting medications filled after last hospital stay. Pt reported he was having abdominal pain so he came back to the ED. Pt reported good family support, no issues noted. Pt reported he lives with his spouse in a 2 story home with 3 LIYAH. Pt reported he has a  1st floor s/u. Pt reported his preferred pharmacy is [facility] and his PCP is Rubén Moore MD. No further CM needs identified at this time. CM will remain available to assist with dcp as appropriate.    Treatment Team Recommendation: Home  Discharge Destination Plan:: Home

## 2024-07-08 NOTE — H&P
[facility]  Grant Shannon 39 y.o. male MRN: [id number]  Unit/Bed#: ED-01 Encounter: [id number]      Assessment:  39 M readmitted from recent micro-perforated diverticulitis with DC on 7-days of Augmentin, found to have continuation of diverticulitis.    Plan:  - CLD  - Zosyn 48 hrs. Transition to po abx  - serial abdominal/clinical exams  - DVT ppx    HPI:  Grant Shannno is a 39 y.o. male with PMHx of recent micro-perforated diverticulitis (admitted 6/21-6/25, DC on 7 days of Augmentin), no prior surgeries who presents with fever of 102.7 and RLQ pain. Started yesterday 7/9 at 1600. Describes pain as moderate, intermittently cramping. Never occurred before. Biological father had stomach or colon cancer in 30s. No family history of IBD. No colonoscopies, scheduled for gen surgery office visit this Wednesday 7/10. Tylenol helped with the fever and pain.  Endorses nausea, one episode of bilious vomiting today 7/8 at 1030. Last ate at last night 7/7 at 2200. Last BM at 7/7. Passing flatus.    Objective   VSS on RA.  /83 (BP Location: Right arm)   Pulse 97   Temp 98 °F (36.7 °C) (Oral)   Resp 18   SpO2 97%     Physical Exam  Constitutional:       Appearance: Normal appearance.   HENT:      Head: Atraumatic.   Eyes:      Conjunctiva/sclera: Conjunctivae normal.      Pupils: Pupils are equal, round, and reactive to light.   Cardiovascular:      Rate and Rhythm: Normal rate and regular rhythm.   Pulmonary:      Effort: Pulmonary effort is normal.      Breath sounds: Normal breath sounds.   Abdominal:      General: There is no distension.      Palpations: Abdomen is soft.      Tenderness: There is no abdominal tenderness.   Musculoskeletal:      Right lower leg: No edema.      Left lower leg: No edema.   Skin:     General: Skin is warm and dry.   Neurological:      Mental Status: He is alert and oriented to person, place, and time.   Psychiatric:         Behavior: Behavior  normal.       Review of Systems   Constitutional:  Positive for fever. Negative for appetite change and chills.   HENT:  Negative for ear pain and sore throat.    Eyes:  Negative for pain and visual disturbance.   Respiratory: Negative.  Negative for cough and shortness of breath.    Cardiovascular: Negative.  Negative for chest pain and palpitations.   Gastrointestinal:  Positive for abdominal pain, nausea and vomiting.        One episode on bilious, non-bloody vomiting 7/8   Genitourinary:  Negative for dysuria and hematuria.   Musculoskeletal:  Negative for arthralgias and back pain.   Skin: Negative.  Negative for color change and rash.   Neurological:  Negative for seizures and syncope.   Hematological: Negative.    Psychiatric/Behavioral: Negative.     All other systems reviewed and are negative.      07/08/24 CT ap: previous sigmoid perforation no longer contains gas, decreasing collection at 1.7 x 0.9 cm (previous 1.8 x 1.8 x 1.3)    Meds: reviewed    Results:  Recent Labs     07/08/24  0734 07/08/24  0749   WBC 13.94*  --    HGB 14.5  --      --    SODIUM 137  --    K 4.0  --      --    CO2 27  --    BUN 7  --    CREATININE 1.01  --    GLUC 108  --    CALCIUM 9.9  --    AST 13  --    ALT 21  --    ALKPHOS 65  --    TBILI 1.32*  --    LIPASE 13  --    EGFR 93  --    PTT  --  36   INR  --  1.06     Lab Results   Component Value Date    BLOODCX No Growth After 5 Days. 06/23/2024    BLOODCX No Growth After 5 Days. 06/23/2024    LEUKOCYTESUR Negative 07/08/2024    NITRITE Negative 07/08/2024    GLUCOSEU Negative 07/08/2024    KETONESU Negative 07/08/2024    BLOODU Negative 07/08/2024       Lab Results: I have personally reviewed pertinent lab results.  Imaging: I have personally reviewed pertinent reports.  EKG, Pathology, and Other Studies: I have personally reviewed pertinent reports.  All current active meds have been reviewed  Counseling / Coordination of Care: Total floor / unit time spent  today 30 minutes.  Greater than 50% of total time was spent with the patient and / or family counseling and / or coordination of care.

## 2024-07-08 NOTE — PROGRESS NOTES
General Surgery  Progress Note   Grant Shannon 39 y.o. male MRN: [id number]  Unit/Bed#: S -01 Encounter: [id number]    Assessment:  40 yo male with recent admission for diverticulitis with micro perforation, presented with fever and nausea .       Plan:  -Continue IV Zosyn, PO Augmentin on DC  -Advance to lo residue diet  -DC fluids  -Monitor abdominal exam  -Trend fever/wbc curve  -Pain/ nausea control PRN  -OOB/ ambulation  -Please message the General surgery resident role with any concerns      Subjective/Objective     Subjective:   Patient seen and examined at bedside, in no acute distress. No acute events overnight.  Pain significantly better since 5:30 PM yesterday.  Patient is passing nonbloody bowel movements.  Patient is tolerating clears.  Patient is ambulating.  No nausea vomiting fevers chills chest pain or shortness of breath.    Objective:   Vitals:Blood pressure 127/86, pulse (!) 111, temperature 97.5 °F (36.4 °C), temperature source Oral, resp. rate 18, weight 108 kg (238 lb 1.6 oz), SpO2 94%.  Temp (24hrs), Av.1 °F (36.7 °C), Min:97.5 °F (36.4 °C), Max:98.7 °F (37.1 °C)      I/O          07 0700  0701   07 07 0700    IV Piggyback   100    Total Intake(mL/kg)   100 (0.9)    Net   +100                   Invasive Devices       Peripheral Intravenous Line  Duration             Peripheral IV 24 Dorsal (posterior);Left Forearm <1 day    Peripheral IV 24 Right Antecubital <1 day                    Physical Exam:  GEN: NAD  HEENT: MMM  CV: well perfused RR  Lung: Normal effort  Ab: Soft, ND, NT  Extrem: No CCE   Neuro: A+Ox3     Lab Results   Component Value Date    WBC 13.94 (H) 2024    HGB 14.5 2024    HCT 43.9 2024    MCV 82 2024     2024      Lab Results   Component Value Date    SODIUM 137 2024    K 4.0 2024     2024    CO2 27 2024    AGAP 9 2024    BUN 7  07/08/2024    CREATININE 1.01 07/08/2024    GLUC 108 07/08/2024    CALCIUM 9.9 07/08/2024    AST 13 07/08/2024    ALT 21 07/08/2024    ALKPHOS 65 07/08/2024    TP 8.4 07/08/2024    TBILI 1.32 (H) 07/08/2024    EGFR 93 07/08/2024       VTE Prophylaxis: Enoxaparin (Lovenox)        Michaela Johns MD  7/9/2024  6:03 AM

## 2024-07-09 LAB
ANION GAP SERPL CALCULATED.3IONS-SCNC: 7 MMOL/L (ref 4–13)
ATRIAL RATE: 101 BPM
BASOPHILS # BLD AUTO: 0.05 THOUSANDS/ÂΜL (ref 0–0.1)
BASOPHILS NFR BLD AUTO: 1 % (ref 0–1)
BUN SERPL-MCNC: 10 MG/DL (ref 5–25)
CALCIUM SERPL-MCNC: 8.5 MG/DL (ref 8.4–10.2)
CHLORIDE SERPL-SCNC: 104 MMOL/L (ref 96–108)
CO2 SERPL-SCNC: 26 MMOL/L (ref 21–32)
CREAT SERPL-MCNC: 0.86 MG/DL (ref 0.6–1.3)
EOSINOPHIL # BLD AUTO: 0.15 THOUSAND/ÂΜL (ref 0–0.61)
EOSINOPHIL NFR BLD AUTO: 2 % (ref 0–6)
ERYTHROCYTE [DISTWIDTH] IN BLOOD BY AUTOMATED COUNT: 12.6 % (ref 11.6–15.1)
GFR SERPL CREATININE-BSD FRML MDRD: 109 ML/MIN/1.73SQ M
GLUCOSE SERPL-MCNC: 88 MG/DL (ref 65–140)
HCT VFR BLD AUTO: 37.7 % (ref 36.5–49.3)
HGB BLD-MCNC: 12.3 G/DL (ref 12–17)
IMM GRANULOCYTES # BLD AUTO: 0.06 THOUSAND/UL (ref 0–0.2)
IMM GRANULOCYTES NFR BLD AUTO: 1 % (ref 0–2)
LYMPHOCYTES # BLD AUTO: 1.75 THOUSANDS/ÂΜL (ref 0.6–4.47)
LYMPHOCYTES NFR BLD AUTO: 19 % (ref 14–44)
MAGNESIUM SERPL-MCNC: 2.1 MG/DL (ref 1.9–2.7)
MCH RBC QN AUTO: 26.9 PG (ref 26.8–34.3)
MCHC RBC AUTO-ENTMCNC: 32.6 G/DL (ref 31.4–37.4)
MCV RBC AUTO: 82 FL (ref 82–98)
MONOCYTES # BLD AUTO: 0.96 THOUSAND/ÂΜL (ref 0.17–1.22)
MONOCYTES NFR BLD AUTO: 10 % (ref 4–12)
NEUTROPHILS # BLD AUTO: 6.35 THOUSANDS/ÂΜL (ref 1.85–7.62)
NEUTS SEG NFR BLD AUTO: 67 % (ref 43–75)
NRBC BLD AUTO-RTO: 0 /100 WBCS
P AXIS: 41 DEGREES
PHOSPHATE SERPL-MCNC: 2.4 MG/DL (ref 2.7–4.5)
PLATELET # BLD AUTO: 285 THOUSANDS/UL (ref 149–390)
PMV BLD AUTO: 12.8 FL (ref 8.9–12.7)
POTASSIUM SERPL-SCNC: 3.4 MMOL/L (ref 3.5–5.3)
PR INTERVAL: 144 MS
QRS AXIS: 101 DEGREES
QRSD INTERVAL: 78 MS
QT INTERVAL: 336 MS
QTC INTERVAL: 435 MS
RBC # BLD AUTO: 4.58 MILLION/UL (ref 3.88–5.62)
SODIUM SERPL-SCNC: 137 MMOL/L (ref 135–147)
T WAVE AXIS: 20 DEGREES
VENTRICULAR RATE: 101 BPM
WBC # BLD AUTO: 9.32 THOUSAND/UL (ref 4.31–10.16)

## 2024-07-09 PROCEDURE — 99232 SBSQ HOSP IP/OBS MODERATE 35: CPT | Performed by: SURGERY

## 2024-07-09 PROCEDURE — 80048 BASIC METABOLIC PNL TOTAL CA: CPT

## 2024-07-09 PROCEDURE — 84100 ASSAY OF PHOSPHORUS: CPT

## 2024-07-09 PROCEDURE — 93010 ELECTROCARDIOGRAM REPORT: CPT | Performed by: INTERNAL MEDICINE

## 2024-07-09 PROCEDURE — 83735 ASSAY OF MAGNESIUM: CPT

## 2024-07-09 PROCEDURE — 85025 COMPLETE CBC W/AUTO DIFF WBC: CPT | Performed by: PHYSICIAN ASSISTANT

## 2024-07-09 RX ORDER — SODIUM CHLORIDE, SODIUM LACTATE, POTASSIUM CHLORIDE, CALCIUM CHLORIDE 600; 310; 30; 20 MG/100ML; MG/100ML; MG/100ML; MG/100ML
75 INJECTION, SOLUTION INTRAVENOUS CONTINUOUS
Status: DISCONTINUED | OUTPATIENT
Start: 2024-07-09 | End: 2024-07-10

## 2024-07-09 RX ORDER — SODIUM CHLORIDE, SODIUM LACTATE, POTASSIUM CHLORIDE, CALCIUM CHLORIDE 600; 310; 30; 20 MG/100ML; MG/100ML; MG/100ML; MG/100ML
125 INJECTION, SOLUTION INTRAVENOUS CONTINUOUS
Status: DISCONTINUED | OUTPATIENT
Start: 2024-07-09 | End: 2024-07-09

## 2024-07-09 RX ADMIN — SODIUM CHLORIDE, SODIUM LACTATE, POTASSIUM CHLORIDE, AND CALCIUM CHLORIDE 75 ML/HR: .6; .31; .03; .02 INJECTION, SOLUTION INTRAVENOUS at 11:52

## 2024-07-09 RX ADMIN — SODIUM CHLORIDE, SODIUM LACTATE, POTASSIUM CHLORIDE, AND CALCIUM CHLORIDE 125 ML/HR: .6; .31; .03; .02 INJECTION, SOLUTION INTRAVENOUS at 11:36

## 2024-07-09 RX ADMIN — PIPERACILLIN SODIUM AND TAZOBACTAM SODIUM 4.5 G: 36; 4.5 INJECTION, POWDER, LYOPHILIZED, FOR SOLUTION INTRAVENOUS at 13:55

## 2024-07-09 RX ADMIN — PIPERACILLIN SODIUM AND TAZOBACTAM SODIUM 4.5 G: 36; 4.5 INJECTION, POWDER, LYOPHILIZED, FOR SOLUTION INTRAVENOUS at 06:06

## 2024-07-09 RX ADMIN — PIPERACILLIN SODIUM AND TAZOBACTAM SODIUM 4.5 G: 36; 4.5 INJECTION, POWDER, LYOPHILIZED, FOR SOLUTION INTRAVENOUS at 21:36

## 2024-07-09 NOTE — PLAN OF CARE
Problem: PAIN - ADULT  Goal: Verbalizes/displays adequate comfort level or baseline comfort level  Description: Interventions:  - Encourage patient to monitor pain and request assistance  - Assess pain using appropriate pain scale  - Administer analgesics based on type and severity of pain and evaluate response  - Implement non-pharmacological measures as appropriate and evaluate response  - Consider cultural and social influences on pain and pain management  - Notify physician/advanced practitioner if interventions unsuccessful or patient reports new pain  Outcome: Progressing     Problem: INFECTION - ADULT  Goal: Absence or prevention of progression during hospitalization  Description: INTERVENTIONS:  - Assess and monitor for signs and symptoms of infection  - Monitor lab/diagnostic results  - Monitor all insertion sites, i.e. indwelling lines, tubes, and drains  - Monitor endotracheal if appropriate and nasal secretions for changes in amount and color  - [place] appropriate cooling/warming therapies per order  - Administer medications as ordered  - Instruct and encourage patient and family to use good hand hygiene technique  - Identify and instruct in appropriate isolation precautions for identified infection/condition  Outcome: Progressing  Goal: Absence of fever/infection during neutropenic period  Description: INTERVENTIONS:  - Monitor WBC    Outcome: Progressing

## 2024-07-09 NOTE — UTILIZATION REVIEW
Initial Clinical Review    Admission: Date/Time/Statement:   Admission Orders (From admission, onward)       Ordered        07/08/24 1148  Inpatient Admission  Once                          Orders Placed This Encounter   Procedures    Inpatient Admission     Standing Status:   Standing     Number of Occurrences:   1     Order Specific Question:   Level of Care     Answer:   Med Surg [16]     Order Specific Question:   Estimated length of stay     Answer:   More than 2 Midnights     Order Specific Question:   Certification     Answer:   I certify that inpatient services are medically necessary for this patient for a duration of greater than two midnights. See H&P and MD Progress Notes for additional information about the patient's course of treatment.     ED Arrival Information       Expected   -    Arrival   7/8/2024 06:38    Acuity   Urgent              Means of arrival   Walk-In    Escorted by   Self    Service   Surgery-General    Admission type   Emergency              Arrival complaint   Abd pain/Fever             Chief Complaint   Patient presents with    Abdominal Pain     Pt reports being seen two weeks ago and diagnosed with diverticulitis. Pt reports two days intermittent RLQ pain along with fevers tmax 102.        Initial Presentation: 39 y.o. male with hx  recent micro-perforated diverticulitis (admitted 6/21-6/25, DC on 7 days of Augmentin)  who presents to ED from home with fever of 102.7 and RLQ pain  that started yesterday afternoon . Pt reoports pain as moderate, intermittently cramping . Associated nausea, vomited x 1  7/8. Last bm 7/7, passing flatus.  On exam,pt tachycardic , has discomfort left lower quadrant but no rebound no guarding.  Labs WBC 13.94,  T bili 1.32. CT A/P shows persistent diverticulitis, inflammatory changes,  with smaller contained perforation . Pt given IVF, IV abx, IV analgesics, IV/IM antiemetic in ED. PT admitted as Inpatient by general sx service with continuation of  diverticulitis. Plan- IV Zosyn. Serial abdominal exams . DVT ppx. IVF. CL diet Pain control . PRN antiemetics,.     Date: 7/9    Day 2:   Felt tired and wiped out. Cool and clammy last evening. No recorded fevers. Did have an episode of vomiting last night, otherwise johan CL diet .  Had some loose stools.Pain significantly better since 5:30 PM yesterday. WBC 9.32 today .  Continue IV Zosyn for another 24 hrs . Advance diet to CL w/ toast and crax. D/C IVF . Trend WBC, fever curve . OOB ambulation . Tentative d/c tomorrow on another round of oral antibiotics.     ED Triage Vitals   Temperature Pulse Respirations Blood Pressure SpO2 Pain Score   07/08/24 0655 07/08/24 0655 07/08/24 0655 07/08/24 0655 07/08/24 0655 07/08/24 1120   98 °F (36.7 °C) (!) 120 18 132/83 97 % 7     Weight (last 2 days)       Date/Time Weight    07/08/24 1120 108 (238.1)            Vital Signs (last 3 days)       Date/Time Temp Pulse Resp BP MAP (mmHg) SpO2 O2 Device Patient Position - Orthostatic VS [place] Coma Scale Score Pain    07/09/24 0739 97.6 °F (36.4 °C) -- -- -- -- -- -- -- 15 No Pain    07/09/24 07:37:05 -- 92 16 111/76 88 94 % -- -- -- --    07/08/24 21:05:20 -- 111 -- 127/86 100 94 % -- -- -- --    07/08/24 2059 -- -- -- -- -- -- None (Room air) -- 15 No Pain    07/08/24 2025 -- 105 18 120/80 94 95 % None (Room air) Lying -- No Pain    07/08/24 1522 97.5 °F (36.4 °C) 111 20 153/94 -- 97 % None (Room air) Sitting -- --    07/08/24 1518 -- -- -- -- -- -- -- -- -- 7    07/08/24 1318 -- 98 18 111/60 -- 97 % None (Room air) Lying -- --    07/08/24 1120 98.7 °F (37.1 °C) 100 18 125/92 105 96 % None (Room air) Sitting -- 7    07/08/24 0741 -- -- -- -- -- -- -- -- 15 --    07/08/24 0724 -- 97 -- -- -- -- -- -- -- --    07/08/24 0655 98 °F (36.7 °C) 120 18 132/83 -- 97 % None (Room air) Sitting -- --              Pertinent Labs/Diagnostic Test Results:   Radiology:  CT abdomen pelvis with contrast   Final Interpretation by Augusto Mims  MD Cipriano (07/08 0954)      Persistent proximal to mid sigmoid diverticulitis with decreased size of contained perforation compared to 6/23/2024. The contained fluid collection now measures 1.7 x 0.9 cm and no longer contains gas. No new or enlarging intra-abdominal collection.      Similar small mural thickening of the adjacent small bowel, likely reactive enteritis.      Unchanged hepatomegaly and hepatic steatosis with subcentimeter hyperattenuating lesions at the right hepatic dome and left hepatic lobe. See CT of the abdomen and pelvis 6/23/2024 for follow-up recommendations.         Workstation performed: [id number]           Cardiology:  ECG 12 lead   Final Result by Ry Muro DO (07/09 0715)   Sinus tachycardia   Rightward axis   Borderline ECG   No previous ECGs available   Confirmed by Ry Muro ([id number]) on 7/9/2024 7:15:38 AM              Results from last 7 days   Lab Units 07/09/24  0631 07/08/24  0734   WBC Thousand/uL 9.32 13.94*   HEMOGLOBIN g/dL 12.3 14.5   HEMATOCRIT % 37.7 43.9   PLATELETS Thousands/uL 285 340   TOTAL NEUT ABS Thousands/µL 6.35 10.10*         Results from last 7 days   Lab Units 07/09/24  0631 07/08/24  0734   SODIUM mmol/L 137 137   POTASSIUM mmol/L 3.4* 4.0   CHLORIDE mmol/L 104 101   CO2 mmol/L 26 27   ANION GAP mmol/L 7 9   BUN mg/dL 10 7   CREATININE mg/dL 0.86 1.01   EGFR ml/min/1.73sq m 109 93   CALCIUM mg/dL 8.5 9.9   MAGNESIUM mg/dL 2.1  --    PHOSPHORUS mg/dL 2.4*  --      Results from last 7 days   Lab Units 07/08/24  0734   AST U/L 13   ALT U/L 21   ALK PHOS U/L 65   TOTAL PROTEIN g/dL 8.4   ALBUMIN g/dL 4.7   TOTAL BILIRUBIN mg/dL 1.32*         Results from last 7 days   Lab Units 07/09/24  0631 07/08/24  0734   GLUCOSE RANDOM mg/dL 88 108           Results from last 7 days   Lab Units 07/08/24  0749   PROTIME seconds 14.4   INR  1.06   PTT seconds 36         Results from last 7 days   Lab Units 07/08/24  0734   PROCALCITONIN ng/ml 0.17      Results from last 7 days   Lab Units 07/08/24  0748   LACTIC ACID mmol/L 0.7               Results from last 7 days   Lab Units 07/08/24  0734   LIPASE u/L 13                 Results from last 7 days   Lab Units 07/08/24  0737   CLARITY UA  Clear   COLOR UA  Yellow   SPEC GRAV UA  1.016   PH UA  6.0   GLUCOSE UA mg/dl Negative   KETONES UA mg/dl Negative   BLOOD UA  Negative   PROTEIN UA mg/dl Trace*   NITRITE UA  Negative   BILIRUBIN UA  Negative   UROBILINOGEN UA (BE) mg/dl <2.0   LEUKOCYTES UA  Negative   WBC UA /hpf 4-10*   RBC UA /hpf 1-2   BACTERIA UA /hpf None Seen   EPITHELIAL CELLS WET PREP /hpf Occasional   MUCUS THREADS  Occasional*                                 Results from last 7 days   Lab Units 07/08/24  0755 07/08/24  0748   BLOOD CULTURE  Received in Microbiology Lab. Culture in Progress. Received in Microbiology Lab. Culture in Progress.                   ED Treatment-Medication Administration from 07/08/2024 0638 to 07/08/2024 2050         Date/Time Order Dose Route Action     07/08/2024 0802 piperacillin-tazobactam (ZOSYN) IVPB 4.5 g 4.5 g Intravenous New Bag     07/08/2024 0759 sodium chloride 0.9 % bolus 1,000 mL 1,000 mL Intravenous New Bag     07/08/2024 0855 iohexol (OMNIPAQUE) 350 MG/ML injection (MULTI-DOSE) 100 mL 100 mL Intravenous Given     07/08/2024 1150 acetaminophen (Ofirmev) injection 1,000 mg 1,000 mg Intravenous New Bag     07/08/2024 1148 ondansetron (ZOFRAN) injection 4 mg 4 mg Intravenous Given     07/08/2024 1518 HYDROmorphone (DILAUDID) injection 0.5 mg 0.5 mg Intravenous Given     07/08/2024 1223 piperacillin-tazobactam (ZOSYN) IVPB (EXTENDED INFUSION) 4.5 g 4.5 g Intravenous Given     07/08/2024 1223 lactated ringers infusion 100 mL/hr Intravenous New Bag     07/08/2024 1517 trimethobenzamide (TIGAN) IM injection 200 mg 200 mg Intramuscular Given            Past Medical History:   Diagnosis Date    Diverticulitis      Present on Admission:  **None**      Admitting  Diagnosis: Sepsis (HCC) [A41.9]  Diverticulitis of large intestine with perforation without abscess or bleeding [K57.20]  Age/Sex: 39 y.o. male  Admission Orders:  Scheduled Medications:  enoxaparin, 40 mg, Subcutaneous, Daily  nicotine, 1 patch, Transdermal, Daily  piperacillin-tazobactam, 4.5 g, Intravenous, Q8H      Continuous IV Infusions:  lactated ringers, 75 mL/hr, Intravenous, Continuous    actated ringers infusion  Rate: 100 mL/hr Dose: 100 mL/hr  Freq: Continuous Route: IV  Indications of Use: IV Hydration  Last Dose: 100 mL/hr (07/08/24 1223)  Start: 07/08/24 1200 End: 07/09/24 0702  PRN Meds:  acetaminophen, 975 mg, Oral, Q6H PRN  albuterol, 2 puff, Inhalation, Q6H PRN  HYDROmorphone, 0.5 mg, Intravenous, Q3H PRN  ondansetron, 4 mg, Intravenous, Q4H PRN  oxyCODONE, 10 mg, Oral, Q4H PRN  oxyCODONE, 5 mg, Oral, Q4H PRN  trimethobenzamide, 200 mg, Intramuscular, Q6H PRN    CL diet--->surgical CL diet w/ toast and crax  SCD   UP as johan / ambulate YID     None    Network Utilization Review Department  ATTENTION: Please call with any questions or concerns to [phone] and carefully listen to the prompts so that you are directed to the right person. All voicemails are confidential.   For Discharge needs, contact Care Management DC Support Team at [phone] opt. [phone]  Send all requests for admission clinical reviews, approved or denied determinations and any other requests to dedicated fax number below belonging to the campus where the patient is receiving treatment. List of dedicated fax numbers for the Facilities:  FACILITY NAME UR FAX NUMBER   ADMISSION DENIALS (Administrative/Medical Necessity) [phone]   DISCHARGE SUPPORT TEAM (NETWORK) [phone]   PARENT CHILD HEALTH (Maternity/NICU/Pediatrics) [phone]   [facility] [phone]   [facility] [phone]   [facility] [phone]   [facility]  [place] [phone]   [facility] [phone]   [facility] [phone]   [facility] [phone]   [facility] [phone]   [facility] [phone]   [facility] [phone]   [facility] [phone]   [facility] [phone]

## 2024-07-09 NOTE — PLAN OF CARE
Problem: PAIN - ADULT  Goal: Verbalizes/displays adequate comfort level or baseline comfort level  Description: Interventions:  - Encourage patient to monitor pain and request assistance  - Assess pain using appropriate pain scale  - Administer analgesics based on type and severity of pain and evaluate response  - Implement non-pharmacological measures as appropriate and evaluate response  - Consider cultural and social influences on pain and pain management  - Notify physician/advanced practitioner if interventions unsuccessful or patient reports new pain  Outcome: Progressing     Problem: INFECTION - ADULT  Goal: Absence or prevention of progression during hospitalization  Description: INTERVENTIONS:  - Assess and monitor for signs and symptoms of infection  - Monitor lab/diagnostic results  - Monitor all insertion sites, i.e. indwelling lines, tubes, and drains  - Monitor endotracheal if appropriate and nasal secretions for changes in amount and color  - [place] appropriate cooling/warming therapies per order  - Administer medications as ordered  - Instruct and encourage patient and family to use good hand hygiene technique  - Identify and instruct in appropriate isolation precautions for identified infection/condition  Outcome: Progressing  Goal: Absence of fever/infection during neutropenic period  Description: INTERVENTIONS:  - Monitor WBC    Outcome: Progressing

## 2024-07-09 NOTE — PLAN OF CARE
Problem: PAIN - ADULT  Goal: Verbalizes/displays adequate comfort level or baseline comfort level  Description: Interventions:  - Encourage patient to monitor pain and request assistance  - Assess pain using appropriate pain scale  - Administer analgesics based on type and severity of pain and evaluate response  - Implement non-pharmacological measures as appropriate and evaluate response  - Consider cultural and social influences on pain and pain management  - Notify physician/advanced practitioner if interventions unsuccessful or patient reports new pain  Outcome: Progressing     Problem: INFECTION - ADULT  Goal: Absence or prevention of progression during hospitalization  Description: INTERVENTIONS:  - Assess and monitor for signs and symptoms of infection  - Monitor lab/diagnostic results  - Monitor all insertion sites, i.e. indwelling lines, tubes, and drains  - Monitor endotracheal if appropriate and nasal secretions for changes in amount and color  - [place] appropriate cooling/warming therapies per order  - Administer medications as ordered  - Instruct and encourage patient and family to use good hand hygiene technique  - Identify and instruct in appropriate isolation precautions for identified infection/condition  Outcome: Progressing

## 2024-07-10 VITALS
OXYGEN SATURATION: 94 % | WEIGHT: 238.1 LBS | HEART RATE: 79 BPM | RESPIRATION RATE: 16 BRPM | BODY MASS INDEX: 36.2 KG/M2 | DIASTOLIC BLOOD PRESSURE: 81 MMHG | TEMPERATURE: 97.9 F | SYSTOLIC BLOOD PRESSURE: 137 MMHG

## 2024-07-10 LAB
ANION GAP SERPL CALCULATED.3IONS-SCNC: 6 MMOL/L (ref 4–13)
BASOPHILS # BLD AUTO: 0.04 THOUSANDS/ÂΜL (ref 0–0.1)
BASOPHILS NFR BLD AUTO: 1 % (ref 0–1)
BUN SERPL-MCNC: 9 MG/DL (ref 5–25)
CALCIUM SERPL-MCNC: 8.6 MG/DL (ref 8.4–10.2)
CHLORIDE SERPL-SCNC: 106 MMOL/L (ref 96–108)
CO2 SERPL-SCNC: 27 MMOL/L (ref 21–32)
CREAT SERPL-MCNC: 0.9 MG/DL (ref 0.6–1.3)
EOSINOPHIL # BLD AUTO: 0.19 THOUSAND/ÂΜL (ref 0–0.61)
EOSINOPHIL NFR BLD AUTO: 4 % (ref 0–6)
ERYTHROCYTE [DISTWIDTH] IN BLOOD BY AUTOMATED COUNT: 12.3 % (ref 11.6–15.1)
GFR SERPL CREATININE-BSD FRML MDRD: 107 ML/MIN/1.73SQ M
GLUCOSE SERPL-MCNC: 79 MG/DL (ref 65–140)
HCT VFR BLD AUTO: 37.2 % (ref 36.5–49.3)
HGB BLD-MCNC: 12.2 G/DL (ref 12–17)
IMM GRANULOCYTES # BLD AUTO: 0.02 THOUSAND/UL (ref 0–0.2)
IMM GRANULOCYTES NFR BLD AUTO: 0 % (ref 0–2)
LYMPHOCYTES # BLD AUTO: 2.04 THOUSANDS/ÂΜL (ref 0.6–4.47)
LYMPHOCYTES NFR BLD AUTO: 38 % (ref 14–44)
MCH RBC QN AUTO: 27.1 PG (ref 26.8–34.3)
MCHC RBC AUTO-ENTMCNC: 32.8 G/DL (ref 31.4–37.4)
MCV RBC AUTO: 83 FL (ref 82–98)
MONOCYTES # BLD AUTO: 0.53 THOUSAND/ÂΜL (ref 0.17–1.22)
MONOCYTES NFR BLD AUTO: 10 % (ref 4–12)
NEUTROPHILS # BLD AUTO: 2.54 THOUSANDS/ÂΜL (ref 1.85–7.62)
NEUTS SEG NFR BLD AUTO: 47 % (ref 43–75)
NRBC BLD AUTO-RTO: 0 /100 WBCS
PLATELET # BLD AUTO: 282 THOUSANDS/UL (ref 149–390)
PMV BLD AUTO: 12 FL (ref 8.9–12.7)
POTASSIUM SERPL-SCNC: 3.6 MMOL/L (ref 3.5–5.3)
RBC # BLD AUTO: 4.51 MILLION/UL (ref 3.88–5.62)
SODIUM SERPL-SCNC: 139 MMOL/L (ref 135–147)
WBC # BLD AUTO: 5.36 THOUSAND/UL (ref 4.31–10.16)

## 2024-07-10 PROCEDURE — NC001 PR NO CHARGE: Performed by: SURGERY

## 2024-07-10 PROCEDURE — 85025 COMPLETE CBC W/AUTO DIFF WBC: CPT | Performed by: PHYSICIAN ASSISTANT

## 2024-07-10 PROCEDURE — 80048 BASIC METABOLIC PNL TOTAL CA: CPT | Performed by: PHYSICIAN ASSISTANT

## 2024-07-10 PROCEDURE — 99232 SBSQ HOSP IP/OBS MODERATE 35: CPT | Performed by: SURGERY

## 2024-07-10 RX ORDER — POTASSIUM CHLORIDE 20 MEQ/1
40 TABLET, EXTENDED RELEASE ORAL ONCE
Status: COMPLETED | OUTPATIENT
Start: 2024-07-10 | End: 2024-07-10

## 2024-07-10 RX ORDER — AMOXICILLIN AND CLAVULANATE POTASSIUM 875; 125 MG/1; MG/1
1 TABLET, FILM COATED ORAL EVERY 12 HOURS SCHEDULED
Qty: 14 TABLET | Refills: 0 | Status: SHIPPED | OUTPATIENT
Start: 2024-07-10 | End: 2024-07-17

## 2024-07-10 RX ADMIN — SODIUM CHLORIDE, SODIUM LACTATE, POTASSIUM CHLORIDE, AND CALCIUM CHLORIDE 75 ML/HR: .6; .31; .03; .02 INJECTION, SOLUTION INTRAVENOUS at 05:40

## 2024-07-10 RX ADMIN — PIPERACILLIN SODIUM AND TAZOBACTAM SODIUM 4.5 G: 36; 4.5 INJECTION, POWDER, LYOPHILIZED, FOR SOLUTION INTRAVENOUS at 05:39

## 2024-07-10 RX ADMIN — POTASSIUM CHLORIDE 40 MEQ: 1500 TABLET, EXTENDED RELEASE ORAL at 11:23

## 2024-07-10 NOTE — DISCHARGE SUMMARY
"Discharge Summary - Acute Care Surgery  Grant Shannon 39 y.o. male MRN: [id number]  Unit/Bed#: S -01 Encounter: [id number]    Admission Date: 7/8/2024     Discharge Date:07/10/24    Attending:Brijesh Castorena DO     Consultants:     Admitting Diagnosis: Sepsis (HCC) [A41.9]  Diverticulitis of large intestine with perforation without abscess or bleeding [K57.20]    Principle/ Secondary Diagnosis:  Past Medical History:   Diagnosis Date    Diverticulitis      History reviewed. No pertinent surgical history.    Procedures Performed:   Orders Placed This Encounter   Procedures    Critical Care    ED ECG Documentation Only     No admission procedures for hospital encounter.  Procedure name not found.      Laboratory data at discharge:   Results from last 7 days   Lab Units 07/10/24  0454 07/09/24  0631 07/08/24  0734   WBC Thousand/uL 5.36 9.32 13.94*   HEMOGLOBIN g/dL 12.2 12.3 14.5   HEMATOCRIT % 37.2 37.7 43.9   PLATELETS Thousands/uL 282 285 340     Results from last 7 days   Lab Units 07/10/24  0454 07/09/24  0631 07/08/24  0734   POTASSIUM mmol/L 3.6 3.4* 4.0   CHLORIDE mmol/L 106 104 101   CO2 mmol/L 27 26 27   BUN mg/dL 9 10 7   CREATININE mg/dL 0.90 0.86 1.01   CALCIUM mg/dL 8.6 8.5 9.9     Results from last 7 days   Lab Units 07/08/24  0749   INR  1.06   PTT seconds 36           Discharge instructions/Information to patient and family:   See after visit summary for information provided to patient and family.     Discharge Medications:  See after visit summary for reconciled discharge medications provided to patient and family.      Hospital Course:   HPI: Per Dr. Salcido \"Grant Shannon is a 39 y.o. male with PMHx of recent micro-perforated diverticulitis (admitted 6/21-6/25, DC on 7 days of Augmentin), no prior surgeries who presents with fever of 102.7 and RLQ pain. Started yesterday 7/9 at 1600. Describes pain as moderate, intermittently cramping. Never occurred before. Biological father had " "stomach or colon cancer in 30s. No family history of IBD. No colonoscopies, scheduled for gen surgery office visit this Wednesday 7/10. Tylenol helped with the fever and pain.  Endorses nausea, one episode of bilious vomiting today 7/8 at 1030. Last ate at last night 7/7 at 2200. Last BM at 7/7. Passing flatus.\"    CT scan on admission showed persistent proximal to mid sigmoid diverticulitis with decreased size of contained perforation  compared to prior scan 6/23/2024. Patient was started on IV Zosyn and clear liquid diet. The next following days his diet was slowly advanced and on day of discharge was tolerating diet, having bowel function and pain controlled. Patient was discharged on 7 days of oral antibiotics and follow up appointment in 3 weeks.    Prior to discharge, the patient was given instructions for outpatient care and follow-up.  The patient has been instructed to call w/ any questions, changes, or concerns for the medical condition.    For further details of the hospitalization, please refer to the medical record.    Condition at Discharge: good     Provisions for Follow-Up Care:  See after visit summary for information related to follow-up care and any pertinent home health orders.      Disposition: Home    Planned Readmission: No    Discharge Statement   I spent 25 minutes discharging the patient. This time was spent on the day of discharge. I had direct contact with the patient on the day of discharge. Additional documentation is required if more than 30 minutes were spent on discharge.     Michaela Johns MD  General Surgery Resident    This text is generated with voice recognition software. There may be translation, syntax,  or grammatical errors. If you have any questions, please contact the dictating provider.      "

## 2024-07-10 NOTE — INCIDENTAL FINDINGS
The following findings require follow up:  Radiographic finding   Finding: CT of the abdomen and pelvis 07/08/2024: Unchanged hepatomegaly and hepatic steatosis with subcentimeter hyperattenuating lesions at the right hepatic dome and left hepatic lobe. See CT of the abdomen and pelvis 6/23/2024 for follow-up recommendations.     CT of the abdomen and pelvis 6/23/2024: Stable hepatomegaly with steatosis.  There are two stable 1 cm hyperdense lesion at the right hepatic dome and anterior medial segment left hepatic lobe, neither completely evaluated on this single phase exam. As recommended previously: In the setting of nonalcoholic steatohepatitis,   nonemergent Gadavist MRI is recommended for further evaluation.     Follow up required: MRI   Follow up should be done within 1 month(s)    Please notify the following clinician to assist with the follow up:   Dr. Rubén Moore    Incidental finding results were discussed with the Patient by Michaela Jonhs MD on 07/10/24.   They expressed understanding and all questions answered.    Michaela Johns MD  7/10/2024 9:45 AM

## 2024-07-10 NOTE — PROGRESS NOTES
Progress Note  Grant Shannon 39 y.o. male MRN: [id number]  Unit/Bed#: S -01 Encounter: [id number]    Assessment  39 M with recent admission for diverticulitis for microperforation, presenting with fever nausea 7/8.    Plan  -Clears liquids toast  -Transition to Augmentin p.o. from Zosyn  -Pain/nausea PRN  -OOB  -DVT PPx    Subjective/Objective   NAOE. Pain controlled. Tolerating diet, no n/v.  Having BM, having flatus. Voiding. Walking.    VSS on RA.  /85   Pulse 76   Temp 97.8 °F (36.6 °C) (Oral)   Resp 17   Wt 108 kg (238 lb 1.6 oz)   SpO2 94%   BMI 36.20 kg/m²     Physical Exam:  General: NAD  CV: nl rate  Lungs: nl wob. No resp distress.  ABD: Soft, ND, NT  Extrem: No CCE    UOP: cc    07/10/24 labs pending  Recent Labs     07/08/24  0734 07/08/24  0749 07/09/24  0631   WBC 13.94*  --  9.32   HGB 14.5  --  12.3     --  285   SODIUM 137  --  137   K 4.0  --  3.4*     --  104   CO2 27  --  26   BUN 7  --  10   CREATININE 1.01  --  0.86   GLUC 108  --  88   CALCIUM 9.9  --  8.5   MG  --   --  2.1   PHOS  --   --  2.4*   AST 13  --   --    ALT 21  --   --    ALKPHOS 65  --   --    TBILI 1.32*  --   --    LIPASE 13  --   --    EGFR 93  --  109   PTT  --  36  --    INR  --  1.06  --

## 2024-07-10 NOTE — DISCHARGE INSTR - AVS FIRST PAGE
Discharge instructions      Activity: You may go up and down stairs, walk as much as you are comfortable, but walk at least 3 times each day.    Medications: Resume all of your previous medications, unless told otherwise by the doctor. Tylenol and ibuoprofen is always fine, unless you are taking any narcotic pain medication containing Tylenol (such as Percocet, Darvocet, Vicodin, or anything containing acetaminophen). Do not take Tylenol if you're taking these medications. You do not need to take the narcotic pain medications unless you are having significant pain and discomfort.    Diet: Resume a soft diet until antibiotics are completed.    Call the office: If you are experiencing any of the following, fevers above 101.5°, significant nausea or vomiting, if you have significant diarrhea or other worsening symptoms.    Pain: You may be given a prescription for pain.

## 2024-07-11 ENCOUNTER — NURSE TRIAGE (OUTPATIENT)
Age: 39
End: 2024-07-11

## 2024-07-11 NOTE — TELEPHONE ENCOUNTER
"Pt of Dr. Castorena, admitted 7/8/24 for diverticulitis.    REASON FOR CALL: diet questions    Pt's wife calling.  Asking what type of diet pt should be following.  Reviewed AVS which states soft diet until antibiotics are completed.   Pt's wife verbalizes understanding.     Reason for Disposition   Information only question and nurse able to answer    Answer Assessment - Initial Assessment Questions  1. REASON FOR CALL or QUESTION: \"What is your reason for calling today?\" or \"How can I best help you?\" or \"What question do you have that I can help answer?\"      Diet questions    Protocols used: Information Only Call - No Triage-ADULT-[place]    "

## 2024-07-11 NOTE — UTILIZATION REVIEW
NOTIFICATION OF ADMISSION DISCHARGE   This is a Notification of Discharge from [facility]. Please be advised that this patient has been discharge from our facility. Below you will find the admission and discharge date and time including the patient’s disposition.   UTILIZATION REVIEW CONTACT:  Jeanette Vega  Utilization   Network Utilization Review Department  Phone: [phone] x carefully listen to the prompts. All voicemails are confidential.  Email: [email or web address]     ADMISSION INFORMATION  PRESENTATION DATE: 7/8/2024  6:50 AM  OBERVATION ADMISSION DATE: N/A  INPATIENT ADMISSION DATE: 7/8/24 11:48 AM   DISCHARGE DATE: 7/10/2024 11:28 AM   DISPOSITION:Home/Self Care    Network Utilization Review Department  ATTENTION: Please call with any questions or concerns to [phone] and carefully listen to the prompts so that you are directed to the right person. All voicemails are confidential.   For Discharge needs, contact Care Management DC Support Team at [phone] opt. [phone]  Send all requests for admission clinical reviews, approved or denied determinations and any other requests to dedicated fax number below belonging to the campus where the patient is receiving treatment. List of dedicated fax numbers for the Facilities:  FACILITY NAME UR FAX NUMBER   ADMISSION DENIALS (Administrative/Medical Necessity) [phone]   DISCHARGE SUPPORT TEAM ([facility]) [phone]   PARENT CHILD HEALTH (Maternity/NICU/Pediatrics) [phone]   [facility] [phone]   [facility] [phone]   [facility] [phone]   [facility] [phone]   [facility] [phone]   [facility] [phone]   [facility] [phone]   [facility] [phone]    [facility] [phone]   [facility] [phone]   [facility] [phone]   [facility] [phone]

## 2024-07-13 LAB
BACTERIA BLD CULT: NORMAL
BACTERIA BLD CULT: NORMAL

## 2024-08-07 ENCOUNTER — PREP FOR PROCEDURE (OUTPATIENT)
Dept: SURGERY | Facility: CLINIC | Age: 39
End: 2024-08-07

## 2024-08-07 ENCOUNTER — OFFICE VISIT (OUTPATIENT)
Dept: SURGERY | Facility: CLINIC | Age: 39
End: 2024-08-07
Payer: COMMERCIAL

## 2024-08-07 VITALS
SYSTOLIC BLOOD PRESSURE: 140 MMHG | HEIGHT: 68 IN | HEART RATE: 82 BPM | DIASTOLIC BLOOD PRESSURE: 90 MMHG | BODY MASS INDEX: 35.61 KG/M2 | WEIGHT: 235 LBS

## 2024-08-07 DIAGNOSIS — K21.9 GERD (GASTROESOPHAGEAL REFLUX DISEASE): ICD-10-CM

## 2024-08-07 DIAGNOSIS — K57.20 DIVERTICULITIS OF LARGE INTESTINE WITH PERFORATION WITHOUT ABSCESS OR BLEEDING: Primary | ICD-10-CM

## 2024-08-07 DIAGNOSIS — K57.92 DIVERTICULITIS: Primary | ICD-10-CM

## 2024-08-07 PROCEDURE — 99213 OFFICE O/P EST LOW 20 MIN: CPT | Performed by: SURGERY

## 2024-08-07 NOTE — PROGRESS NOTES
Assessment/Plan:    Diagnoses and all orders for this visit:    Diverticulitis of large intestine with perforation without abscess or bleeding    GERD (gastroesophageal reflux disease)    This point may resume regular diet with fiber supplements.  Please drink plenty of water.  Diet has changed and he does not necessarily experience reflux issues as much.  If he does he can take Prilosec OTC.    There is a family history for colon and prostate cancer.  Questionable stomach cancer as well    Discussed risks and benefits of a colonoscopy and EGD including perforation and bleeding and he agrees to proceed.    Subjective:      Patient ID: Grant Shannon is a 39 y.o. male.    Patient presents for hospital follow up.  Hospitalized 6/21 to 6/25 for acute diverticulitis with abscess.  Return again in early July with fever and continued pain.  Fortunately the CAT scan was improving.    He is now on daily Metamucil.  Also used to get a lot of reflux issues specially after eating Posta with sauces.  Been eating a bland diet and notices that his reflux issues are not as bad.  Would take Tums with some temporary relief.  Never was any type of PPI.    Does state a family history of colon and prostate cancer.  Questionable stomach cancer.      Pre colonoscopy and EGD consult.        The following portions of the patient's history were reviewed and updated as appropriate:     He  has a past medical history of Diverticulitis and Diverticulitis of colon (06/2024).  He  has no past surgical history on file.  His family history includes Cancer in his maternal grandfather.  He  reports that he quit smoking about 2 months ago. His smoking use included cigarettes. He started smoking about 16 years ago. He quit smokeless tobacco use about 2 months ago.  His smokeless tobacco use included chew. He reports that he does not currently use alcohol. He reports that he does not use drugs.  Current Outpatient Medications   Medication Sig Dispense  "Refill    albuterol (PROVENTIL HFA,VENTOLIN HFA) 90 mcg/act inhaler Inhale 2 puffs every 6 (six) hours as needed for wheezing or shortness of breath 1 Inhaler 0    benzonatate (TESSALON PERLES) 100 mg capsule Take 1 capsule (100 mg total) by mouth 3 (three) times a day as needed for cough 30 capsule 0    fluticasone (FLONASE) 50 mcg/act nasal spray 1 spray into each nostril daily 1 Bottle 0     No current facility-administered medications for this visit.     He is allergic to other..    Review of Systems   All other systems reviewed and are negative.        Objective:      /90   Pulse 82   Ht 5' 8\" (1.727 m)   Wt 107 kg (235 lb)   BMI 35.73 kg/m²          Physical Exam  Constitutional:       General: He is not in acute distress.  HENT:      Head: Atraumatic.      Mouth/Throat:      Mouth: Mucous membranes are moist.   Eyes:      Extraocular Movements: Extraocular movements intact.   Cardiovascular:      Rate and Rhythm: Normal rate.   Pulmonary:      Effort: Pulmonary effort is normal.   Abdominal:      Palpations: Abdomen is soft. There is no mass.      Tenderness: There is no abdominal tenderness.      Hernia: No hernia is present.   Musculoskeletal:      Cervical back: Normal range of motion.   Skin:     General: Skin is warm and dry.   Neurological:      Mental Status: He is alert.         "

## 2024-08-07 NOTE — LETTER
August 7, 2024     Rubén Moore MD  [address]  [address]  [facility] [address]    Patient: Grant Shannon   YOB: 1985   Date of Visit: 8/7/2024       Dear Dr. Moore:    Thank you for referring Grant Shannon to me for evaluation. Below are my notes for this consultation.    If you have questions, please do not hesitate to call me. I look forward to following your patient along with you.         Sincerely,        Brijesh Castorena DO        CC: No Recipients    Brijesh Castorena DO  8/7/2024  2:45 PM  Sign when Signing Visit  Assessment/Plan:    Diagnoses and all orders for this visit:    Diverticulitis of large intestine with perforation without abscess or bleeding    GERD (gastroesophageal reflux disease)    This point may resume regular diet with fiber supplements.  Please drink plenty of water.  Diet has changed and he does not necessarily experience reflux issues as much.  If he does he can take Prilosec OTC.    There is a family history for colon and prostate cancer.  Questionable stomach cancer as well    Discussed risks and benefits of a colonoscopy and EGD including perforation and bleeding and he agrees to proceed.    Subjective:      Patient ID: Grant Shannon is a 39 y.o. male.    Patient presents for hospital follow up.  Hospitalized 6/21 to 6/25 for acute diverticulitis with abscess.  Return again in early July with fever and continued pain.  Fortunately the CAT scan was improving.    He is now on daily Metamucil.  Also used to get a lot of reflux issues specially after eating Posta with sauces.  Been eating a bland diet and notices that his reflux issues are not as bad.  Would take Tums with some temporary relief.  Never was any type of PPI.    Does state a family history of colon and prostate cancer.  Questionable stomach cancer.      Pre colonoscopy and EGD consult.        The following portions of the patient's history were reviewed and updated as appropriate:     He   "has a past medical history of Diverticulitis and Diverticulitis of colon (06/2024).  He  has no past surgical history on file.  His family history includes Cancer in his maternal grandfather.  He  reports that he quit smoking about 2 months ago. His smoking use included cigarettes. He started smoking about 16 years ago. He quit smokeless tobacco use about 2 months ago.  His smokeless tobacco use included chew. He reports that he does not currently use alcohol. He reports that he does not use drugs.  Current Outpatient Medications   Medication Sig Dispense Refill   • albuterol (PROVENTIL HFA,VENTOLIN HFA) 90 mcg/act inhaler Inhale 2 puffs every 6 (six) hours as needed for wheezing or shortness of breath 1 Inhaler 0   • benzonatate (TESSALON PERLES) 100 mg capsule Take 1 capsule (100 mg total) by mouth 3 (three) times a day as needed for cough 30 capsule 0   • fluticasone (FLONASE) 50 mcg/act nasal spray 1 spray into each nostril daily 1 Bottle 0     No current facility-administered medications for this visit.     He is allergic to other..    Review of Systems   All other systems reviewed and are negative.        Objective:      /90   Pulse 82   Ht 5' 8\" (1.727 m)   Wt 107 kg (235 lb)   BMI 35.73 kg/m²          Physical Exam  Constitutional:       General: He is not in acute distress.  HENT:      Head: Atraumatic.      Mouth/Throat:      Mouth: Mucous membranes are moist.   Eyes:      Extraocular Movements: Extraocular movements intact.   Cardiovascular:      Rate and Rhythm: Normal rate.   Pulmonary:      Effort: Pulmonary effort is normal.   Abdominal:      Palpations: Abdomen is soft. There is no mass.      Tenderness: There is no abdominal tenderness.      Hernia: No hernia is present.   Musculoskeletal:      Cervical back: Normal range of motion.   Skin:     General: Skin is warm and dry.   Neurological:      Mental Status: He is alert.         "

## 2024-09-03 ENCOUNTER — ANESTHESIA EVENT (OUTPATIENT)
Dept: ANESTHESIOLOGY | Facility: HOSPITAL | Age: 39
End: 2024-09-03

## 2024-09-03 ENCOUNTER — ANESTHESIA (OUTPATIENT)
Dept: ANESTHESIOLOGY | Facility: HOSPITAL | Age: 39
End: 2024-09-03

## 2024-09-16 ENCOUNTER — TELEPHONE (OUTPATIENT)
Age: 39
End: 2024-09-16

## 2024-09-16 ENCOUNTER — NURSE TRIAGE (OUTPATIENT)
Age: 39
End: 2024-09-16

## 2024-09-16 NOTE — TELEPHONE ENCOUNTER
Patient calling in stating he lost his colonoscopy prep instructions, attempted to warm transfer to CTS but all lines busy, Please return his call when able.

## 2024-09-16 NOTE — TELEPHONE ENCOUNTER
"Pt's wife, Ledy, called in. [facility] Medical Communication Consent Form verified. Pt scheduled for colonoscopy & endoscopy tomorrow with Dr. Castorena.    Reports Pt misplaced bowel prep instructions and do not know what time procedure is tomorrow.     Updated wife that they will receive call from pre-procedure today around 2-8pm with arrival time.    Called [facility] office clinical backline and spoke with Lesa Tavarez. Wife warm-transferred.    Reason for Disposition   Information only question and nurse able to answer    Answer Assessment - Initial Assessment Questions  1. REASON FOR CALL or QUESTION: \"What is your reason for calling today?\" or \"How can I best help you?\" or \"What question do you have that I can help answer?\"      Bowel prep instructions    Protocols used: Information Only Call - No Triage-ADULT-[place]    "

## 2024-09-17 ENCOUNTER — ANESTHESIA (OUTPATIENT)
Dept: GASTROENTEROLOGY | Facility: AMBULARY SURGERY CENTER | Age: 39
End: 2024-09-17
Payer: COMMERCIAL

## 2024-09-17 ENCOUNTER — HOSPITAL ENCOUNTER (OUTPATIENT)
Dept: GASTROENTEROLOGY | Facility: AMBULARY SURGERY CENTER | Age: 39
Setting detail: OUTPATIENT SURGERY
Discharge: HOME | End: 2024-09-17
Attending: SURGERY
Payer: COMMERCIAL

## 2024-09-17 ENCOUNTER — NURSE TRIAGE (OUTPATIENT)
Dept: OTHER | Facility: OTHER | Age: 39
End: 2024-09-17

## 2024-09-17 VITALS
HEIGHT: 68 IN | BODY MASS INDEX: 36.37 KG/M2 | SYSTOLIC BLOOD PRESSURE: 103 MMHG | WEIGHT: 240 LBS | HEART RATE: 100 BPM | DIASTOLIC BLOOD PRESSURE: 59 MMHG | RESPIRATION RATE: 21 BRPM | OXYGEN SATURATION: 92 % | TEMPERATURE: 98.7 F

## 2024-09-17 DIAGNOSIS — K21.9 GERD (GASTROESOPHAGEAL REFLUX DISEASE): ICD-10-CM

## 2024-09-17 DIAGNOSIS — K21.9 GERD (GASTROESOPHAGEAL REFLUX DISEASE): Primary | ICD-10-CM

## 2024-09-17 DIAGNOSIS — K57.92 DIVERTICULITIS: ICD-10-CM

## 2024-09-17 PROCEDURE — 45378 DIAGNOSTIC COLONOSCOPY: CPT | Performed by: SURGERY

## 2024-09-17 PROCEDURE — 43250 EGD CAUTERY TUMOR POLYP: CPT | Performed by: SURGERY

## 2024-09-17 PROCEDURE — 88305 TISSUE EXAM BY PATHOLOGIST: CPT | Performed by: PATHOLOGY

## 2024-09-17 PROCEDURE — 43239 EGD BIOPSY SINGLE/MULTIPLE: CPT | Performed by: SURGERY

## 2024-09-17 RX ORDER — PROPOFOL 10 MG/ML
INJECTION, EMULSION INTRAVENOUS AS NEEDED
Status: DISCONTINUED | OUTPATIENT
Start: 2024-09-17 | End: 2024-09-17

## 2024-09-17 RX ORDER — SODIUM CHLORIDE, SODIUM LACTATE, POTASSIUM CHLORIDE, CALCIUM CHLORIDE 600; 310; 30; 20 MG/100ML; MG/100ML; MG/100ML; MG/100ML
INJECTION, SOLUTION INTRAVENOUS CONTINUOUS PRN
Status: DISCONTINUED | OUTPATIENT
Start: 2024-09-17 | End: 2024-09-17

## 2024-09-17 RX ORDER — OMEPRAZOLE 20 MG/1
20 TABLET, DELAYED RELEASE ORAL DAILY
Qty: 90 TABLET | Refills: 4 | Status: SHIPPED | OUTPATIENT
Start: 2024-09-17

## 2024-09-17 RX ORDER — PROPOFOL 10 MG/ML
INJECTION, EMULSION INTRAVENOUS CONTINUOUS PRN
Status: DISCONTINUED | OUTPATIENT
Start: 2024-09-17 | End: 2024-09-17

## 2024-09-17 RX ORDER — LIDOCAINE HYDROCHLORIDE 10 MG/ML
INJECTION, SOLUTION EPIDURAL; INFILTRATION; INTRACAUDAL; PERINEURAL AS NEEDED
Status: DISCONTINUED | OUTPATIENT
Start: 2024-09-17 | End: 2024-09-17

## 2024-09-17 RX ADMIN — SODIUM CHLORIDE, SODIUM LACTATE, POTASSIUM CHLORIDE, AND CALCIUM CHLORIDE: .6; .31; .03; .02 INJECTION, SOLUTION INTRAVENOUS at 14:31

## 2024-09-17 RX ADMIN — PROPOFOL 30 MG: 10 INJECTION, EMULSION INTRAVENOUS at 14:28

## 2024-09-17 RX ADMIN — PROPOFOL 40 MG: 10 INJECTION, EMULSION INTRAVENOUS at 14:10

## 2024-09-17 RX ADMIN — PROPOFOL 120 MCG/KG/MIN: 10 INJECTION, EMULSION INTRAVENOUS at 14:36

## 2024-09-17 RX ADMIN — PROPOFOL 130 MG: 10 INJECTION, EMULSION INTRAVENOUS at 14:07

## 2024-09-17 RX ADMIN — PROPOFOL 40 MG: 10 INJECTION, EMULSION INTRAVENOUS at 14:11

## 2024-09-17 RX ADMIN — PROPOFOL 40 MG: 10 INJECTION, EMULSION INTRAVENOUS at 14:08

## 2024-09-17 RX ADMIN — PROPOFOL 30 MG: 10 INJECTION, EMULSION INTRAVENOUS at 14:22

## 2024-09-17 RX ADMIN — PROPOFOL 40 MG: 10 INJECTION, EMULSION INTRAVENOUS at 14:12

## 2024-09-17 RX ADMIN — LIDOCAINE HYDROCHLORIDE 50 MG: 10 INJECTION, SOLUTION EPIDURAL; INFILTRATION; INTRACAUDAL at 14:07

## 2024-09-17 RX ADMIN — PROPOFOL 30 MG: 10 INJECTION, EMULSION INTRAVENOUS at 14:24

## 2024-09-17 RX ADMIN — PROPOFOL 30 MG: 10 INJECTION, EMULSION INTRAVENOUS at 14:20

## 2024-09-17 RX ADMIN — PROPOFOL 30 MG: 10 INJECTION, EMULSION INTRAVENOUS at 14:16

## 2024-09-17 RX ADMIN — PROPOFOL 30 MG: 10 INJECTION, EMULSION INTRAVENOUS at 14:18

## 2024-09-17 RX ADMIN — PROPOFOL 30 MG: 10 INJECTION, EMULSION INTRAVENOUS at 14:30

## 2024-09-17 RX ADMIN — PROPOFOL 30 MG: 10 INJECTION, EMULSION INTRAVENOUS at 14:26

## 2024-09-17 RX ADMIN — PROPOFOL 40 MG: 10 INJECTION, EMULSION INTRAVENOUS at 14:13

## 2024-09-17 RX ADMIN — SODIUM CHLORIDE, SODIUM LACTATE, POTASSIUM CHLORIDE, AND CALCIUM CHLORIDE: .6; .31; .03; .02 INJECTION, SOLUTION INTRAVENOUS at 13:49

## 2024-09-17 RX ADMIN — PROPOFOL 40 MG: 10 INJECTION, EMULSION INTRAVENOUS at 14:09

## 2024-09-17 NOTE — H&P
"History and Physical -General Surgical Care   Grant Shannon 39 y.o. male MRN: [id number]  Unit/Bed#:  Encounter: [id number]       Principal Problem: History of diverticulitis and GERD    HPI: Grant Shannon is a 39 y.o. year old male who presents with above.  Please see office note from .  He was treated conservatively with antibiotics.  Occasionally gets reflux.      Review of Systems    Historical Information   Past Medical History:   Diagnosis Date    Diverticulitis     Diverticulitis of colon 2024     Past Surgical History:   Procedure Laterality Date    SHOULDER SURGERY Right      Social History   Social History     Substance and Sexual Activity   Alcohol Use Yes    Comment: occasionally     Social History     Substance and Sexual Activity   Drug Use Never     Social History     Tobacco Use   Smoking Status Former    Current packs/day: 0.00    Types: Cigarettes    Start date: 11/10/2007    Quit date: 2024    Years since quittin.2   Smokeless Tobacco Former    Types: Chew    Quit date: 2024     Family History   Problem Relation Age of Onset    Cancer Maternal Grandfather        Meds/Allergies     Not in a hospital admission.  No current facility-administered medications for this encounter.    Allergies   Allergen Reactions    Other Sneezing     Cats           Blood pressure 112/70, pulse 88, temperature (!) 96.7 °F (35.9 °C), temperature source Temporal, resp. rate 18, height 5' 8\" (1.727 m), weight 109 kg (240 lb), SpO2 96%.    No intake or output data in the 24 hours ending 24 1356    PHYSICAL EXAM  General appearance: alert and oriented, in no acute distress  Lungs: clear to auscultation bilaterally  Heart: regular rate and rhythm, S1, S2 normal, no murmur, click, rub or gallop  Abdomen: soft, non-tender; bowel sounds normal; no masses,  no organomegaly  Rectal: deferred  Skin: Skin color, texture, turgor normal. No rashes or lesions    Lab Results:   No visits with results " within 1 Day(s) from this visit.   Latest known visit with results is:   Admission on 07/08/2024, Discharged on 07/10/2024   Component Date Value    WBC 07/08/2024 13.94 (H)     RBC 07/08/2024 5.34     Hemoglobin 07/08/2024 14.5     Hematocrit 07/08/2024 43.9     MCV 07/08/2024 82     MCH 07/08/2024 27.2     MCHC 07/08/2024 33.0     RDW 07/08/2024 12.3     MPV 07/08/2024 12.1     Platelets 07/08/2024 340     nRBC 07/08/2024 0     Segmented % 07/08/2024 71     Immature Grans % 07/08/2024 1     Lymphocytes % 07/08/2024 16     Monocytes % 07/08/2024 10     Eosinophils Relative 07/08/2024 1     Basophils Relative 07/08/2024 1     Absolute Neutrophils 07/08/2024 10.10 (H)     Absolute Immature Grans 07/08/2024 0.08     Absolute Lymphocytes 07/08/2024 2.28     Absolute Monocytes 07/08/2024 1.34 (H)     Eosinophils Absolute 07/08/2024 0.07     Basophils Absolute 07/08/2024 0.07     Sodium 07/08/2024 137     Potassium 07/08/2024 4.0     Chloride 07/08/2024 101     CO2 07/08/2024 27     ANION GAP 07/08/2024 9     BUN 07/08/2024 7     Creatinine 07/08/2024 1.01     Glucose 07/08/2024 108     Calcium 07/08/2024 9.9     AST 07/08/2024 13     ALT 07/08/2024 21     Alkaline Phosphatase 07/08/2024 65     Total Protein 07/08/2024 8.4     Albumin 07/08/2024 4.7     Total Bilirubin 07/08/2024 1.32 (H)     eGFR 07/08/2024 93     Lipase 07/08/2024 13     Color, UA 07/08/2024 Yellow     Clarity, UA 07/08/2024 Clear     Specific Gravity, UA 07/08/2024 1.016     pH, UA 07/08/2024 6.0     Leukocytes, UA 07/08/2024 Negative     Nitrite, UA 07/08/2024 Negative     Protein, UA 07/08/2024 Trace (A)     Glucose, UA 07/08/2024 Negative     Ketones, UA 07/08/2024 Negative     Urobilinogen, UA 07/08/2024 <2.0     Bilirubin, UA 07/08/2024 Negative     Occult Blood, UA 07/08/2024 Negative     LACTIC ACID 07/08/2024 0.7     Procalcitonin 07/08/2024 0.17     Blood Culture 07/08/2024 No Growth After 5 Days.     Blood Culture 07/08/2024 No Growth After  5 Days.     PTT 07/08/2024 36     Protime 07/08/2024 14.4     INR 07/08/2024 1.06     RBC, UA 07/08/2024 1-2     WBC, UA 07/08/2024 4-10 (A)     Epithelial Cells 07/08/2024 Occasional     Bacteria, UA 07/08/2024 None Seen     MUCUS THREADS 07/08/2024 Occasional (A)     Ventricular Rate 07/08/2024 101     Atrial Rate 07/08/2024 101     HI Interval 07/08/2024 144     QRSD Interval 07/08/2024 78     QT Interval 07/08/2024 336     QTC Interval 07/08/2024 435     P Axis 07/08/2024 41     QRS Axis 07/08/2024 101     T Wave Axis 07/08/2024 20     Sodium 07/09/2024 137     Potassium 07/09/2024 3.4 (L)     Chloride 07/09/2024 104     CO2 07/09/2024 26     ANION GAP 07/09/2024 7     BUN 07/09/2024 10     Creatinine 07/09/2024 0.86     Glucose 07/09/2024 88     Calcium 07/09/2024 8.5     eGFR 07/09/2024 109     Magnesium 07/09/2024 2.1     Phosphorus 07/09/2024 2.4 (L)     WBC 07/09/2024 9.32     RBC 07/09/2024 4.58     Hemoglobin 07/09/2024 12.3     Hematocrit 07/09/2024 37.7     MCV 07/09/2024 82     MCH 07/09/2024 26.9     MCHC 07/09/2024 32.6     RDW 07/09/2024 12.6     MPV 07/09/2024 12.8 (H)     Platelets 07/09/2024 285     nRBC 07/09/2024 0     Segmented % 07/09/2024 67     Immature Grans % 07/09/2024 1     Lymphocytes % 07/09/2024 19     Monocytes % 07/09/2024 10     Eosinophils Relative 07/09/2024 2     Basophils Relative 07/09/2024 1     Absolute Neutrophils 07/09/2024 6.35     Absolute Immature Grans 07/09/2024 0.06     Absolute Lymphocytes 07/09/2024 1.75     Absolute Monocytes 07/09/2024 0.96     Eosinophils Absolute 07/09/2024 0.15     Basophils Absolute 07/09/2024 0.05     WBC 07/10/2024 5.36     RBC 07/10/2024 4.51     Hemoglobin 07/10/2024 12.2     Hematocrit 07/10/2024 37.2     MCV 07/10/2024 83     MCH 07/10/2024 27.1     MCHC 07/10/2024 32.8     RDW 07/10/2024 12.3     MPV 07/10/2024 12.0     Platelets 07/10/2024 282     nRBC 07/10/2024 0     Segmented % 07/10/2024 47     Immature Grans % 07/10/2024 0      Lymphocytes % 07/10/2024 38     Monocytes % 07/10/2024 10     Eosinophils Relative 07/10/2024 4     Basophils Relative 07/10/2024 1     Absolute Neutrophils 07/10/2024 2.54     Absolute Immature Grans 07/10/2024 0.02     Absolute Lymphocytes 07/10/2024 2.04     Absolute Monocytes 07/10/2024 0.53     Eosinophils Absolute 07/10/2024 0.19     Basophils Absolute 07/10/2024 0.04     Sodium 07/10/2024 139     Potassium 07/10/2024 3.6     Chloride 07/10/2024 106     CO2 07/10/2024 27     ANION GAP 07/10/2024 6     BUN 07/10/2024 9     Creatinine 07/10/2024 0.90     Glucose 07/10/2024 79     Calcium 07/10/2024 8.6     eGFR 07/10/2024 107      Imaging Studies:     ASSESSMENT: History of diverticulitis.  History of GERD    PLAN: Risks and benefits for colonoscopy and EGD were discussed with him and he agrees to proceed    Counseling / Coordination of Care  Total time spent today  20 minutes. Greater than 50% of total time was spent with the patient and / or family counseling and / or coordination of care.

## 2024-09-17 NOTE — ANESTHESIA PREPROCEDURE EVALUATION
Procedure:  COLONOSCOPY  EGD    Relevant Problems   ANESTHESIA (within normal limits)      CARDIO (within normal limits)      ENDO (within normal limits)      GI/HEPATIC  Diverticulosis    (+) Gastroesophageal reflux disease      /RENAL (within normal limits)      HEMATOLOGY (within normal limits)      MUSCULOSKELETAL (within normal limits)      NEURO/PSYCH (within normal limits)      PULMONARY (within normal limits)        Physical Exam    Airway    Mallampati score: III  TM Distance: >3 FB  Neck ROM: full     Dental   No notable dental hx     Cardiovascular  Rhythm: regular, Rate: normal, Cardiovascular exam normal    Pulmonary  Pulmonary exam normal Breath sounds clear to auscultation    Other Findings  post-pubertal.      Anesthesia Plan  ASA Score- 2     Anesthesia Type- IV sedation with anesthesia with ASA Monitors.         Additional Monitors:     Airway Plan:     Comment: Grant Shannon is a 39 y.o. male with PMHx significant for GERD, diverticulosis c/b abscess in 07/2024 presenting today for colonoscopy    Plan: MAC sedation, PIVx1, standard ASA monitors. Spontaneous respirations with supplemental O2 via nasal cannula vs. Simple face mask.    Anesthesia plan and consent discussed with patient who expressed understanding and agreement. Risks/benefits and alternatives discussed with patient including possible PONV, sore throat, damage to teeth/lips/gums and possibility of rare anesthetic and surgical emergencies. Plan discussed and confirmed with CRNA.        .       Plan Factors-Exercise tolerance (METS): >4 METS.    Chart reviewed. EKG reviewed. Imaging results reviewed. Existing labs reviewed. Patient summary reviewed.    Patient is not a current smoker.  Patient instructed to abstain from smoking on day of procedure. Patient did not smoke on day of surgery.    Obstructive sleep apnea risk education given perioperatively.        Induction- intravenous.    Postoperative Plan-     Perioperative  Resuscitation Plan - Level 1 - Full Code.       Informed Consent- Anesthetic plan and risks discussed with patient and spouse.  I personally reviewed this patient with the CRNA. Discussed and agreed on the Anesthesia Plan with the CRNA..

## 2024-09-17 NOTE — DISCHARGE INSTR - AVS FIRST PAGE
No particular diverticula seen on colonoscopy.  Continue high-fiber diet.  Repeat colonoscopy at age 45    Small sliding hiatal hernia on EGD.  Duodenal polyp noted and removed.    Will prescribe Prilosec 20 mg daily    Will call with biopsy results

## 2024-09-17 NOTE — ANESTHESIA POSTPROCEDURE EVALUATION
Post-Op Assessment Note    CV Status:  Stable  Pain Score: 0    Pain management: adequate       Mental Status:  Awake and sleepy   Hydration Status:  Euvolemic   PONV Controlled:  Controlled   Airway Patency:  Patent     Post Op Vitals Reviewed: Yes    No anethesia notable event occurred.    Staff: Anesthesiologist, CRNA               /58 (09/17/24 1452)    Temp 98.7 °F (37.1 °C) (09/17/24 1452)    Pulse 95 (09/17/24 1452)   Resp 15 (09/17/24 1452)    SpO2 97 % (09/17/24 1452)

## 2024-09-17 NOTE — TELEPHONE ENCOUNTER
"Regarding: Post-procedure/ endoscopy and colonoscopy  ----- Message from Paloma MORA sent at 9/17/2024  6:26 PM EDT -----  Patient's spouse called, \" my  was seen today for a colonoscopy and an endoscopy. He has a fever of 101.3. should I take him to the ED?\"    "

## 2024-09-17 NOTE — TELEPHONE ENCOUNTER
Patient with new onset fever after endoscopy/colonoscopy today. Discussed with on call provider(Dr. Castorena) and he stated he will call the patient.

## 2024-09-17 NOTE — TELEPHONE ENCOUNTER
"Reason for Disposition  • Fever > 100.4 F (38.0 C)    Answer Assessment - Initial Assessment Questions  1. SYMPTOM: \"What's the main symptom you're concerned about?\" (e.g., pain, fever, vomiting)      Fever and chills temp of 101.3  2. ONSET: \"When did  fever/chills start?\"      About 6PM  3. SURGERY: \"What surgery did you have?\"      Endoscopy/colonoscopy   4. DATE of SURGERY: \"When was the surgery?\"       9/17/24  5. ANESTHESIA: \"What type of anesthesia did you have?\" (e.g., general, spinal, epidural, local)      sedation  6. DRAINS: \"Were any drains place in or around the wound?\" (e.g., Hemovac, Avelino-Chan, Penrose)      N/A  7. PAIN: \"Is there any pain?\" If Yes, ask: \"How bad is it?\"  (Scale 1-10; or mild, moderate, severe)      No pain  8. FEVER: \"Do you have a fever?\" If Yes, ask: \"What is your temperature, how was it measured, and when did it start?\"      Yes temp 101.3   9. VOMITING: \"Is there any vomiting?\" If Yes, ask: \"How many times?\"      No  10. BLEEDING: \"Is there any bleeding?\" If Yes, ask: \"How much?\" and \"Where?\"        No  11. OTHER SYMPTOMS: \"Do you have any other symptoms?\" (e.g., drainage from wound, painful urination, constipation)        None    Protocols used: Post-Op Symptoms and Questions-Adult-    "

## 2024-09-20 PROCEDURE — 88305 TISSUE EXAM BY PATHOLOGIST: CPT | Performed by: PATHOLOGY

## 2024-09-25 DIAGNOSIS — K21.9 GERD (GASTROESOPHAGEAL REFLUX DISEASE): Primary | ICD-10-CM

## 2024-09-25 NOTE — PROGRESS NOTES
Called with EGD results.  Some reflux changes with intestinal metaplasia.  Will place on Prilosec 20 mg daily.  Have asked him to follow-up with GI for repeat EGD in 1 year.  Colonoscopy without inflammatory changes.  Recommend high-fiber diet with plenty of fluids.  Repeat colonoscopy age 45

## 2024-11-18 ENCOUNTER — OFFICE VISIT (OUTPATIENT)
Dept: URGENT CARE | Facility: CLINIC | Age: 39
End: 2024-11-18
Payer: COMMERCIAL

## 2024-11-18 VITALS
HEIGHT: 68 IN | BODY MASS INDEX: 37.89 KG/M2 | DIASTOLIC BLOOD PRESSURE: 92 MMHG | HEART RATE: 88 BPM | SYSTOLIC BLOOD PRESSURE: 140 MMHG | TEMPERATURE: 98.5 F | WEIGHT: 250 LBS | OXYGEN SATURATION: 98 % | RESPIRATION RATE: 18 BRPM

## 2024-11-18 DIAGNOSIS — J02.9 ACUTE VIRAL PHARYNGITIS: Primary | ICD-10-CM

## 2024-11-18 PROBLEM — Z01.10 OTHER EXAMINATION OF EARS AND HEARING: Status: ACTIVE | Noted: 2024-11-18

## 2024-11-18 LAB — S PYO AG THROAT QL: NEGATIVE

## 2024-11-18 PROCEDURE — 87880 STREP A ASSAY W/OPTIC: CPT | Performed by: FAMILY MEDICINE

## 2024-11-18 PROCEDURE — 99213 OFFICE O/P EST LOW 20 MIN: CPT | Performed by: FAMILY MEDICINE

## 2024-11-18 PROCEDURE — 87070 CULTURE OTHR SPECIMN AEROBIC: CPT | Performed by: FAMILY MEDICINE

## 2024-11-18 RX ORDER — METHYLPREDNISOLONE 4 MG/1
TABLET ORAL
Qty: 21 TABLET | Refills: 0 | Status: SHIPPED | OUTPATIENT
Start: 2024-11-18

## 2024-11-18 NOTE — PATIENT INSTRUCTIONS
- rapid strep test performed in office today is negative, throat swab sent for culture testing, patient/parent/guardian has been instructed to call the office in 2 days to follow up culture results.   - Medrol dose pack prescribed to help with pharyngeal inflammation, take as directed, take with food and water.   - take Tylenol as needed for pain/fever, no NSAIDs (Advil, Aleve, Motrin) while taking steroids.   - patient is to rest and drink plenty of fluids   - advised warm salt water gargles and throat lozenges as needed    - drink warm tea w/ lemon and honey   - may use Chloraseptic throat spray as needed   - advised to run a humidifier at home  - follow up w/ PCP office for re-check in 3-5 days  - if symptoms persist despite treatment, worsen, or any new symptoms present, patient is to be seen in the ER.

## 2024-11-18 NOTE — PROGRESS NOTES
[facility]        NAME: Grant Shannon is a 39 y.o. male  : 1985    MRN: [id number]  DATE: 2024  TIME: 10:17 AM    Assessment and Plan   Acute viral pharyngitis [J02.9]  1. Acute viral pharyngitis  POCT rapid ANTIGEN strepA    Throat culture    Throat culture    methylPREDNISolone 4 MG tablet therapy pack        Patient Instructions     Patient Instructions   - rapid strep test performed in office today is negative, throat swab sent for culture testing, patient/parent/guardian has been instructed to call the office in 2 days to follow up culture results.   - Medrol dose pack prescribed to help with pharyngeal inflammation, take as directed, take with food and water.   - take Tylenol as needed for pain/fever, no NSAIDs (Advil, Aleve, Motrin) while taking steroids.   - patient is to rest and drink plenty of fluids   - advised warm salt water gargles and throat lozenges as needed    - drink warm tea w/ lemon and honey   - may use Chloraseptic throat spray as needed   - advised to run a humidifier at home  - follow up w/ PCP office for re-check in 3-5 days  - if symptoms persist despite treatment, worsen, or any new symptoms present, patient is to be seen in the ER.     Follow up with PCP in 3-5 days.  Proceed to  ER if symptoms worsen.    If tests have been performed at [facility], our office will contact you with results if changes need to be made to the care plan discussed with you at the visit.  You can review your full results on [facility].    Chief Complaint     Chief Complaint   Patient presents with    Sore Throat     Pt states he has had a sore throat for 2 days.      History of Present Illness     40 yo male presents c/o sore throat x 2 days. No other URI symptoms present at this time. No fever/chills. No headache or body aches. No abdominal pain or GI sx. No skin rashes. No loss of taste or smell. Patient feels the glands in his neck are swollen and tender. No feelings  "of throat closing or difficulty swallowing. No drooling or muffled voice. No recent travel or known sick contacts. Patient has no known medication allergies. He has taken Mucinex for the symptoms. Rapid strep test performed in office today is negative.        Review of Systems   Review of Systems   Constitutional: Negative.    HENT:  Positive for sore throat.    Eyes: Negative.    Respiratory: Negative.     Cardiovascular: Negative.    Gastrointestinal: Negative.    Musculoskeletal: Negative.    Skin: Negative.    Allergic/Immunologic: Positive for environmental allergies.   Neurological: Negative.    Hematological: Negative.      Current Medications       Current Outpatient Medications:     methylPREDNISolone 4 MG tablet therapy pack, Use as directed on package, Disp: 21 tablet, Rfl: 0    omeprazole (PriLOSEC OTC) 20 MG tablet, Take 1 tablet (20 mg total) by mouth daily, Disp: 90 tablet, Rfl: 4    omeprazole (PriLOSEC) 20 mg delayed release capsule, Take 1 capsule (20 mg total) by mouth daily, Disp: 30 capsule, Rfl: 4    Current Allergies     Allergies as of 11/18/2024 - Reviewed 11/18/2024   Allergen Reaction Noted    Other Sneezing 06/21/2024            The following portions of the patient's history were reviewed and updated as appropriate: allergies, current medications, past family history, past medical history, past social history, past surgical history and problem list.     Past Medical History:   Diagnosis Date    Diverticulitis     Diverticulitis of colon 06/2024       Past Surgical History:   Procedure Laterality Date    SHOULDER SURGERY Right        Family History   Problem Relation Age of Onset    Cancer Maternal Grandfather          Medications have been verified.        Objective   /92   Pulse 88   Temp 98.5 °F (36.9 °C)   Resp 18   Ht 5' 8\" (1.727 m)   Wt 113 kg (250 lb)   SpO2 98%   BMI 38.01 kg/m²   No LMP for male patient.       Physical Exam     Physical Exam  Vitals and nursing note " reviewed.   Constitutional:       General: He is awake. He is not in acute distress.     Appearance: Normal appearance. He is well-developed and well-groomed. He is not ill-appearing, toxic-appearing or diaphoretic.   HENT:      Head: Normocephalic and atraumatic.      Jaw: There is normal jaw occlusion.      Right Ear: Tympanic membrane, ear canal and external ear normal.      Left Ear: Tympanic membrane, ear canal and external ear normal.      Nose: Nose normal.      Mouth/Throat:      Lips: Pink. No lesions.      Mouth: Mucous membranes are moist. No oral lesions.      Pharynx: Uvula midline. Pharyngeal swelling and posterior oropharyngeal erythema present. No oropharyngeal exudate, uvula swelling or postnasal drip.      Tonsils: No tonsillar exudate or tonsillar abscesses.      Comments: There is erythema and mild edema of the pharynx.  Eyes:      General: Lids are normal.      Conjunctiva/sclera: Conjunctivae normal.   Neck:      Trachea: Trachea and phonation normal.   Cardiovascular:      Rate and Rhythm: Normal rate and regular rhythm.      Pulses: Normal pulses.      Heart sounds: Normal heart sounds.   Pulmonary:      Effort: Pulmonary effort is normal. No tachypnea, accessory muscle usage or respiratory distress.      Breath sounds: Normal breath sounds and air entry.   Musculoskeletal:      Cervical back: Normal range of motion and neck supple. No edema, erythema, rigidity or tenderness.   Lymphadenopathy:      Cervical: No cervical adenopathy.   Skin:     General: Skin is warm and dry.      Capillary Refill: Capillary refill takes less than 2 seconds.      Coloration: Skin is not pale.   Neurological:      Mental Status: He is alert and oriented to person, place, and time. Mental status is at baseline.   Psychiatric:         Mood and Affect: Mood normal.         Behavior: Behavior normal. Behavior is cooperative.         Thought Content: Thought content normal.         Judgment: Judgment normal.

## 2024-11-20 LAB — BACTERIA THROAT CULT: NORMAL

## 2024-12-02 ENCOUNTER — HOSPITAL ENCOUNTER (EMERGENCY)
Facility: HOSPITAL | Age: 39
Discharge: HOME | End: 2024-12-03
Attending: EMERGENCY MEDICINE | Admitting: EMERGENCY MEDICINE
Payer: COMMERCIAL

## 2024-12-02 ENCOUNTER — APPOINTMENT (EMERGENCY)
Dept: CT IMAGING | Facility: HOSPITAL | Age: 39
End: 2024-12-02
Payer: COMMERCIAL

## 2024-12-02 ENCOUNTER — APPOINTMENT (EMERGENCY)
Dept: RADIOLOGY | Facility: HOSPITAL | Age: 39
End: 2024-12-02
Payer: COMMERCIAL

## 2024-12-02 VITALS
DIASTOLIC BLOOD PRESSURE: 70 MMHG | HEART RATE: 94 BPM | SYSTOLIC BLOOD PRESSURE: 137 MMHG | OXYGEN SATURATION: 96 % | TEMPERATURE: 97.6 F | RESPIRATION RATE: 18 BRPM

## 2024-12-02 DIAGNOSIS — R07.9 CHEST PAIN: Primary | ICD-10-CM

## 2024-12-02 DIAGNOSIS — E87.6 HYPOKALEMIA: ICD-10-CM

## 2024-12-02 DIAGNOSIS — R00.2 PALPITATIONS: ICD-10-CM

## 2024-12-02 DIAGNOSIS — E83.42 HYPOMAGNESEMIA: ICD-10-CM

## 2024-12-02 LAB
ALBUMIN SERPL BCG-MCNC: 4.7 G/DL (ref 3.5–5)
ALP SERPL-CCNC: 67 U/L (ref 34–104)
ALT SERPL W P-5'-P-CCNC: 37 U/L (ref 7–52)
ANION GAP SERPL CALCULATED.3IONS-SCNC: 8 MMOL/L (ref 4–13)
AST SERPL W P-5'-P-CCNC: 19 U/L (ref 13–39)
BASOPHILS # BLD AUTO: 0.09 THOUSANDS/ΜL (ref 0–0.1)
BASOPHILS NFR BLD AUTO: 1 % (ref 0–1)
BILIRUB SERPL-MCNC: 0.59 MG/DL (ref 0.2–1)
BUN SERPL-MCNC: 10 MG/DL (ref 5–25)
CALCIUM SERPL-MCNC: 8.8 MG/DL (ref 8.4–10.2)
CARDIAC TROPONIN I PNL SERPL HS: <2 NG/L (ref ?–50)
CHLORIDE SERPL-SCNC: 103 MMOL/L (ref 96–108)
CK SERPL-CCNC: 153 U/L (ref 39–308)
CO2 SERPL-SCNC: 28 MMOL/L (ref 21–32)
CREAT SERPL-MCNC: 0.92 MG/DL (ref 0.6–1.3)
EOSINOPHIL # BLD AUTO: 0.13 THOUSAND/ΜL (ref 0–0.61)
EOSINOPHIL NFR BLD AUTO: 1 % (ref 0–6)
ERYTHROCYTE [DISTWIDTH] IN BLOOD BY AUTOMATED COUNT: 12.7 % (ref 11.6–15.1)
FLUAV AG UPPER RESP QL IA.RAPID: NEGATIVE
FLUBV AG UPPER RESP QL IA.RAPID: NEGATIVE
GFR SERPL CREATININE-BSD FRML MDRD: 104 ML/MIN/1.73SQ M
GLUCOSE SERPL-MCNC: 88 MG/DL (ref 65–140)
HCT VFR BLD AUTO: 45.7 % (ref 36.5–49.3)
HGB BLD-MCNC: 15.3 G/DL (ref 12–17)
IMM GRANULOCYTES # BLD AUTO: 0.05 THOUSAND/UL (ref 0–0.2)
IMM GRANULOCYTES NFR BLD AUTO: 1 % (ref 0–2)
LIPASE SERPL-CCNC: 39 U/L (ref 11–82)
LYMPHOCYTES # BLD AUTO: 3.12 THOUSANDS/ΜL (ref 0.6–4.47)
LYMPHOCYTES NFR BLD AUTO: 31 % (ref 14–44)
MAGNESIUM SERPL-MCNC: 1.8 MG/DL (ref 1.9–2.7)
MCH RBC QN AUTO: 26.6 PG (ref 26.8–34.3)
MCHC RBC AUTO-ENTMCNC: 33.5 G/DL (ref 31.4–37.4)
MCV RBC AUTO: 79 FL (ref 82–98)
MONOCYTES # BLD AUTO: 0.69 THOUSAND/ΜL (ref 0.17–1.22)
MONOCYTES NFR BLD AUTO: 7 % (ref 4–12)
NEUTROPHILS # BLD AUTO: 5.92 THOUSANDS/ΜL (ref 1.85–7.62)
NEUTS SEG NFR BLD AUTO: 59 % (ref 43–75)
NRBC BLD AUTO-RTO: 0 /100 WBCS
PLATELET # BLD AUTO: 348 THOUSANDS/UL (ref 149–390)
PMV BLD AUTO: 11.6 FL (ref 8.9–12.7)
POTASSIUM SERPL-SCNC: 3.4 MMOL/L (ref 3.5–5.3)
PROT SERPL-MCNC: 8.2 G/DL (ref 6.4–8.4)
RBC # BLD AUTO: 5.76 MILLION/UL (ref 3.88–5.62)
SARS-COV+SARS-COV-2 AG RESP QL IA.RAPID: NEGATIVE
SODIUM SERPL-SCNC: 139 MMOL/L (ref 135–147)
TSH SERPL DL<=0.05 MIU/L-ACNC: 3.85 UIU/ML (ref 0.45–4.5)
WBC # BLD AUTO: 10 THOUSAND/UL (ref 4.31–10.16)

## 2024-12-02 PROCEDURE — 93005 ELECTROCARDIOGRAM TRACING: CPT

## 2024-12-02 PROCEDURE — 84443 ASSAY THYROID STIM HORMONE: CPT

## 2024-12-02 PROCEDURE — 96365 THER/PROPH/DIAG IV INF INIT: CPT

## 2024-12-02 PROCEDURE — 71046 X-RAY EXAM CHEST 2 VIEWS: CPT

## 2024-12-02 PROCEDURE — 80053 COMPREHEN METABOLIC PANEL: CPT | Performed by: EMERGENCY MEDICINE

## 2024-12-02 PROCEDURE — 71275 CT ANGIOGRAPHY CHEST: CPT

## 2024-12-02 PROCEDURE — 83690 ASSAY OF LIPASE: CPT | Performed by: EMERGENCY MEDICINE

## 2024-12-02 PROCEDURE — 87811 SARS-COV-2 COVID19 W/OPTIC: CPT

## 2024-12-02 PROCEDURE — 83735 ASSAY OF MAGNESIUM: CPT

## 2024-12-02 PROCEDURE — 96367 TX/PROPH/DG ADDL SEQ IV INF: CPT

## 2024-12-02 PROCEDURE — 99285 EMERGENCY DEPT VISIT HI MDM: CPT

## 2024-12-02 PROCEDURE — 99285 EMERGENCY DEPT VISIT HI MDM: CPT | Performed by: EMERGENCY MEDICINE

## 2024-12-02 PROCEDURE — 96375 TX/PRO/DX INJ NEW DRUG ADDON: CPT

## 2024-12-02 PROCEDURE — 87804 INFLUENZA ASSAY W/OPTIC: CPT

## 2024-12-02 PROCEDURE — 84484 ASSAY OF TROPONIN QUANT: CPT | Performed by: EMERGENCY MEDICINE

## 2024-12-02 PROCEDURE — 85025 COMPLETE CBC W/AUTO DIFF WBC: CPT | Performed by: EMERGENCY MEDICINE

## 2024-12-02 PROCEDURE — 74174 CTA ABD&PLVS W/CONTRAST: CPT

## 2024-12-02 PROCEDURE — 82550 ASSAY OF CK (CPK): CPT

## 2024-12-02 PROCEDURE — 36415 COLL VENOUS BLD VENIPUNCTURE: CPT

## 2024-12-02 RX ORDER — POTASSIUM CHLORIDE 1500 MG/1
20 TABLET, EXTENDED RELEASE ORAL ONCE
Status: COMPLETED | OUTPATIENT
Start: 2024-12-02 | End: 2024-12-02

## 2024-12-02 RX ORDER — MAGNESIUM SULFATE HEPTAHYDRATE 40 MG/ML
2 INJECTION, SOLUTION INTRAVENOUS ONCE
Status: COMPLETED | OUTPATIENT
Start: 2024-12-02 | End: 2024-12-03

## 2024-12-02 RX ORDER — LIDOCAINE 50 MG/G
1 PATCH TOPICAL ONCE
Status: DISCONTINUED | OUTPATIENT
Start: 2024-12-02 | End: 2024-12-03 | Stop reason: HOSPADM

## 2024-12-02 RX ORDER — ACETAMINOPHEN 10 MG/ML
1000 INJECTION, SOLUTION INTRAVENOUS ONCE
Status: COMPLETED | OUTPATIENT
Start: 2024-12-02 | End: 2024-12-02

## 2024-12-02 RX ORDER — SODIUM CHLORIDE, SODIUM GLUCONATE, SODIUM ACETATE, POTASSIUM CHLORIDE, MAGNESIUM CHLORIDE, SODIUM PHOSPHATE, DIBASIC, AND POTASSIUM PHOSPHATE .53; .5; .37; .037; .03; .012; .00082 G/100ML; G/100ML; G/100ML; G/100ML; G/100ML; G/100ML; G/100ML
1000 INJECTION, SOLUTION INTRAVENOUS ONCE
Status: COMPLETED | OUTPATIENT
Start: 2024-12-02 | End: 2024-12-02

## 2024-12-02 RX ORDER — KETOROLAC TROMETHAMINE 30 MG/ML
15 INJECTION, SOLUTION INTRAMUSCULAR; INTRAVENOUS ONCE
Status: DISCONTINUED | OUTPATIENT
Start: 2024-12-02 | End: 2024-12-02

## 2024-12-02 RX ADMIN — SODIUM CHLORIDE, SODIUM GLUCONATE, SODIUM ACETATE, POTASSIUM CHLORIDE, MAGNESIUM CHLORIDE, SODIUM PHOSPHATE, DIBASIC, AND POTASSIUM PHOSPHATE 1000 ML: .53; .5; .37; .037; .03; .012; .00082 INJECTION, SOLUTION INTRAVENOUS at 22:53

## 2024-12-02 RX ADMIN — MAGNESIUM SULFATE HEPTAHYDRATE 2 G: 40 INJECTION, SOLUTION INTRAVENOUS at 23:33

## 2024-12-02 RX ADMIN — IOHEXOL 100 ML: 350 INJECTION, SOLUTION INTRAVENOUS at 23:09

## 2024-12-02 RX ADMIN — LIDOCAINE 1 PATCH: 700 PATCH TOPICAL at 22:40

## 2024-12-02 RX ADMIN — ACETAMINOPHEN 1000 MG: 10 INJECTION INTRAVENOUS at 22:40

## 2024-12-02 RX ADMIN — POTASSIUM CHLORIDE 20 MEQ: 1500 TABLET, EXTENDED RELEASE ORAL at 22:38

## 2024-12-03 LAB
ATRIAL RATE: 115 BPM
CARDIAC TROPONIN I PNL SERPL HS: <2 NG/L (ref ?–50)
P AXIS: 42 DEGREES
PR INTERVAL: 148 MS
QRS AXIS: 169 DEGREES
QRSD INTERVAL: 74 MS
QT INTERVAL: 318 MS
QTC INTERVAL: 439 MS
T WAVE AXIS: 30 DEGREES
VENTRICULAR RATE: 115 BPM

## 2024-12-03 PROCEDURE — 93010 ELECTROCARDIOGRAM REPORT: CPT | Performed by: INTERNAL MEDICINE

## 2024-12-03 NOTE — ED ATTENDING ATTESTATION
12/2/2024  I, Brian Hayes MD, saw and evaluated the patient. I have discussed the patient with the resident/non-physician practitioner and agree with the resident's/non-physician practitioner's findings, Plan of Care, and MDM as documented in the resident's/non-physician practitioner's note, except where noted. All available labs and Radiology studies were reviewed.  I was present for key portions of any procedure(s) performed by the resident/non-physician practitioner and I was immediately available to provide assistance.       At this point I agree with the current assessment done in the Emergency Department.  I have conducted an independent evaluation of this patient a history and physical is as follows: Patient is a 39 year old male with palpitation and chest pain with radiation to the upper back today. No sob. No fever. (+) sweating. No N/V. No travel. No cough. No CAD in family. Was last seen at [facility] in [place] on 11/18/24 for viral pharyngitis. [facility] website checked on this patient and no Rx found. NCAT. No scleral icterus. Moist mucous membranes. Lungs clear. Tachycardia without murmur. Nontender chest wall. Abdomen soft and nontender. Good bowel sounds. No edema. No rash noted. No back tenderness. DDX including but not limited to: ACS, MI, PE, PTX, pneumonia, dissection, pleurisy, pericarditis, myocarditis, rhabdomyolysis, GI etiology. DDx including but not limited to: metabolic abnormality, cardiac arrhythmia,  thyroid disease, anxiety, adverse reaction. I reviewed EKG. Will check labs, CXR and CT dissection study.     ED Course         Critical Care Time  Procedures

## 2024-12-03 NOTE — ED PROVIDER NOTES
Time reflects when diagnosis was documented in both MDM as applicable and the Disposition within this note       Time User Action Codes Description Comment    12/3/2024 12:09 AM Dana Faith Add [R07.9] Chest pain     12/3/2024 12:10 AM Dana Faith Add [R00.2] Palpitations     12/3/2024 12:10 AM Dana Faith Add [E83.42] Hypomagnesemia     12/3/2024 12:10 AM Dana Faith Add [E87.6] Hypokalemia           ED Disposition       ED Disposition   Discharge    Condition   Stable    Date/Time   Tue Dec 3, 2024 12:13 AM    Comment   Grant Shannon discharge to home/self care.                   Assessment & Plan       Medical Decision Making    38 yo M presented to ED for CP. Associated symptoms: palpitations, lightheadedness, diaphoresis, subjective fever at home. Exam findings: Tachycardic but no acute distress, and soft nontender, lungs grossly bilaterally.      Differentials diagnoses considered: Electrolyte disturbance versus dysrhythmia versus ACS versus GERD versus other.     See ED course for more details on lab and imaging interpretation, as well as perinate information that occurred during patient's ED stay.  Based on these results and H&P, unlikely ACS, could be electrolyte disturbances. Results and clinical impressions were discussed with patient and family. They expressed understanding. Plan: Discharged home with instruction to follow-up with primary care doctor, instructed to return emerged part for any worsening symptoms. This plan was also discussed with patient, who was agreeable with this plan. Patient was given the opportunity to ask questions in ED. All questions and concerns were addressed in ED.    This document was created using speech voice recognition software.   Grammatical errors, random word insertions, pronoun errors, and incomplete sentences are an occasional consequence of this system due to software limitations, ambient noise, and hardware issues.   Any formal questions or  concerns about content, text, or information contained within the body of this dictation should be directly addressed to the provider for clarification.     ED Course as of 12/03/24 0720   Mon Dec 02, 2024   2200 Influenza B Rapid Antigen: Negative  Presents with palpitations and chest pain started today.  Patient states palpitations started earlier today.  Around 6 PM developed chest pain that radiated to his back.  Per partner patient felt hot to touch and was pallor when arriving home from work.  Made her concerned so asking patient come to the emergency department.  Denies any other complaints at this time.    Physical exam: Tachycardic but regular abdomen soft nontender no lower extremity edema.  No acute distress   2201 Comprehensive metabolic panel(!)  Slight hypokalemia.  Will replete.  Otherwise unremarkable.   2201 hs TnI 0hr: <2  EKG did not show any acute ischemic changes.  Unlikely ACS.   2251 CBC and differential(!)  No leukocytosis or leukopenia.  Hemoglobin hematocrit within normal limits.  Platelets within normal limits.  Reassuring differential.    2307 MAGNESIUM(!): 1.8  Will replete   2307 Total CK: 153   2307 LIPASE: 39   2312 XR chest 2 views  Portable chest x-ray instead of a 2 view.  No acute cardiopulmonary process visualized.   2341 TSH 3RD GENERATON: 3.853   2341 CTA dissection protocol chest abdomen pelvis w wo contrast  IMPRESSION:     1.  No acute findings in the chest, abdomen or pelvis.  2.  Hepatomegaly with diffuse hepatic steatosis.           2343 SARS COV Rapid Antigen: Negative   2343 Influenza A Rapid Antigen: Negative   Tue Dec 03, 2024   0009 hs TnI 4hr: <2  WNL       Medications   multi-electrolyte (ISOLYTE-S PH 7.4) bolus 1,000 mL (0 mL Intravenous Stopped 12/2/24 2353)   potassium chloride (Klor-Con M20) CR tablet 20 mEq (20 mEq Oral Given 12/2/24 2238)   acetaminophen (Ofirmev) injection 1,000 mg (0 mg Intravenous Stopped 12/2/24 2255)   magnesium sulfate 2 g/50 mL IVPB  (premix) 2 g (2 g Intravenous New Bag 24 2333)   iohexol (OMNIPAQUE) 350 MG/ML injection (MULTI-DOSE) 100 mL (100 mL Intravenous Given 24 2309)       ED Risk Strat Scores   HEART Risk Score      Flowsheet Row Most Recent Value   Heart Score Risk Calculator    History 1 Filed at: 2024   ECG 1 Filed at: 2024   Age 0 Filed at: 2024   Risk Factors 1 Filed at: 2024   Troponin 0 Filed at: 2024   HEART Score 3 Filed at: 2024                               SBIRT 20yo+      Flowsheet Row Most Recent Value   Initial Alcohol Screen: US AUDIT-C     1. How often do you have a drink containing alcohol? 0 Filed at: 2024   2. How many drinks containing alcohol do you have on a typical day you are drinking?  0 Filed at: 2024   3a. Male UNDER 65: How often do you have five or more drinks on one occasion? 0 Filed at: 2024   Audit-C Score 0 Filed at: 2024   ANGELES: How many times in the past year have you...    Used an illegal drug or used a prescription medication for non-medical reasons? Never Filed at: 2024                            History of Present Illness       Chief Complaint   Patient presents with    Chest Pain     Reports mid chest pain radiates to back between shoulder blades, all day with tachycardia, reports diaphoresis.        Past Medical History:   Diagnosis Date    Diverticulitis     Diverticulitis of colon 2024      Past Surgical History:   Procedure Laterality Date    SHOULDER SURGERY Right       Family History   Problem Relation Age of Onset    Cancer Maternal Grandfather       Social History     Tobacco Use    Smoking status: Former     Current packs/day: 0.00     Types: Cigarettes     Start date: 11/10/2007     Quit date: 2024     Years since quittin.5    Smokeless tobacco: Former     Types: Chew     Quit date: 2024   Vaping Use    Vaping status: Never Used   Substance  Use Topics    Alcohol use: Yes     Comment: occasionally    Drug use: Never      E-Cigarette/Vaping    E-Cigarette Use Never User       E-Cigarette/Vaping Substances    Nicotine No     THC No     CBD No     Flavoring No     Other No     Unknown No       I have reviewed and agree with the history as documented.     HPI  39-year-old F with h/o M presenting to ED with CP since 6pm.  Pain radiating to his back.  States left-sided.  States all day today has felt lightheaded and not right.  Patient thought it was due to being in and out of his car and in and out of buildings due to being a .  Associated diaphoresis, lightheadedness, palpitations.  Wife reports subjective fever at home stating patient felt hot.  And was pale in color.  Denies chills, abdominal pain, vomiting, nausea, bowel/bladder changes, and any other symptoms.      Review of Systems  Constitutional: No chills, weight changes, appetite changes +subjective fever, diaphoresis  HENT: No sore throat, rhinorrhea, sinus pain  Eyes: No redness, discharge, pain  Cardio: No lower extremity edema +CP, palpitation  Pulm: No cough, shortness of breath, wheezing  GI: No nausea, vomiting, bowel habit changes  : No dysuria, hematuria, urine output changes  MSK: No joint pain, neck pain +back pain  Neuro: No headache, dizziness, paresthesias  Skin: No wounds or rashes  Otherwise review of symptoms is negative.       Objective       ED Triage Vitals   Temperature Pulse Blood Pressure Respirations SpO2 Patient Position - Orthostatic VS   12/02/24 2118 12/02/24 2117 12/02/24 2117 12/02/24 2117 12/02/24 2117 12/02/24 2300   97.6 °F (36.4 °C) (!) 117 135/96 17 97 % Sitting      Temp Source Heart Rate Source BP Location FiO2 (%) Pain Score    12/02/24 2118 12/02/24 2117 12/02/24 2300 -- --    Oral Monitor Right arm        Vitals      Date and Time Temp Pulse SpO2 Resp BP Pain Score FACES Pain Rating User   12/02/24 2300 -- 94 96 % 18 137/70 -- -- CG   12/02/24  2118 97.6 °F (36.4 °C) -- -- -- -- -- --    12/02/24 2117 -- 117 97 % 17 135/96 -- --             Physical Exam  General appearance: resting comfortably, no acute distress   HENT: Normocephalic, atraumatic, hearing grossly intact, and mucous membranes moist   Eyes: Conjunctiva normal, PERRL, EOM intact   Lungs/Chest: Respirations even and unlabored, breath sounds normal.  No chest wall tenderness.  Cardiovascular: Tachycardia, regular rhythm  Abdomen: soft, non-distended, non-tender  Musculoskeletal: extremities normal, atraumatic, no cyanosis or edema   Pulses: radial / dorsalis pedis pulses palpable  Skin: warm and dry   Neurologic: Awake and alert, no apparent focal deficit  Awake, alert, oriented. No apparent focal deficit  Face symmetric, PERRL. EOM Intact  Speaks spontaneously, actively following commands.   Full strength, moving all extremities equally. Sensation intact to light touch B/L UE and LE.  Psych: Mood consistent with affect       Results Reviewed       Procedure Component Value Units Date/Time    HS Troponin I 4hr [[id number]] Collected: 12/02/24 2328    Lab Status: Final result Specimen: Blood from Arm, Right Updated: 12/03/24 0004     hs TnI 4hr <2 ng/L      Delta 4hr hsTnI --    FLU/COVID Rapid Antigen (30 min. TAT) - Preferred screening test in ED [[id number]]  (Normal) Collected: 12/02/24 2251    Lab Status: Final result Specimen: Nares from Nose Updated: 12/02/24 2343     SARS COV Rapid Antigen Negative     Influenza A Rapid Antigen Negative     Influenza B Rapid Antigen Negative    Narrative:      This test has been performed using the Quidel Lala 2 FLU+SARS Antigen test under the Emergency Use Authorization (EUA). This test has been validated by the  and verified by the performing laboratory. The Lala uses lateral flow immunofluorescent sandwich assay to detect SARS-COV, Influenza A and Influenza B Antigen.     The Quidel Lala 2 SARS Antigen test does not differentiate  between SARS-CoV and SARS-CoV-2.     Negative results are presumptive and may be confirmed with a molecular assay, if necessary, for patient management. Negative results do not rule out SARS-CoV-2 or influenza infection and should not be used as the sole basis for treatment or patient management decisions. A negative test result may occur if the level of antigen in a sample is below the limit of detection of this test.     Positive results are indicative of the presence of viral antigens, but do not rule out bacterial infection or co-infection with other viruses.     All test results should be used as an adjunct to clinical observations and other information available to the provider.    FOR PEDIATRIC PATIENTS - copy/paste COVID Guidelines URL to browser: [email or web address]    TSH, 3rd generation with Free T4 reflex [[id number]]  (Normal) Collected: 12/02/24 2119    Lab Status: Final result Specimen: Blood from Arm, Right Updated: 12/02/24 2323     TSH 3RD GENERATON 3.853 uIU/mL     Magnesium [[id number]]  (Abnormal) Collected: 12/02/24 2119    Lab Status: Final result Specimen: Blood from Arm, Right Updated: 12/02/24 2254     Magnesium 1.8 mg/dL     CK [[id number]]  (Normal) Collected: 12/02/24 2119    Lab Status: Final result Specimen: Blood from Arm, Right Updated: 12/02/24 2254     Total  U/L     Lipase [[id number]]  (Normal) Collected: 12/02/24 2119    Lab Status: Final result Specimen: Blood from Arm, Right Updated: 12/02/24 2254     Lipase 39 u/L     CBC and differential [[id number]]  (Abnormal) Collected: 12/02/24 2119    Lab Status: Final result Specimen: Blood from Arm, Right Updated: 12/02/24 2239     WBC 10.00 Thousand/uL      RBC 5.76 Million/uL      Hemoglobin 15.3 g/dL      Hematocrit 45.7 %      MCV 79 fL      MCH 26.6 pg      MCHC 33.5 g/dL      RDW 12.7 %      MPV 11.6 fL      Platelets 348 Thousands/uL      nRBC 0 /100 WBCs      Segmented %  59 %      Immature Grans % 1 %      Lymphocytes % 31 %      Monocytes % 7 %      Eosinophils Relative 1 %      Basophils Relative 1 %      Absolute Neutrophils 5.92 Thousands/µL      Absolute Immature Grans 0.05 Thousand/uL      Absolute Lymphocytes 3.12 Thousands/µL      Absolute Monocytes 0.69 Thousand/µL      Eosinophils Absolute 0.13 Thousand/µL      Basophils Absolute 0.09 Thousands/µL     HS Troponin 0hr (reflex protocol) [[id number]]  (Normal) Collected: 12/02/24 2119    Lab Status: Final result Specimen: Blood from Arm, Right Updated: 12/02/24 2152     hs TnI 0hr <2 ng/L     Comprehensive metabolic panel [[id number]]  (Abnormal) Collected: 12/02/24 2119    Lab Status: Final result Specimen: Blood from Arm, Right Updated: 12/02/24 2145     Sodium 139 mmol/L      Potassium 3.4 mmol/L      Chloride 103 mmol/L      CO2 28 mmol/L      ANION GAP 8 mmol/L      BUN 10 mg/dL      Creatinine 0.92 mg/dL      Glucose 88 mg/dL      Calcium 8.8 mg/dL      AST 19 U/L      ALT 37 U/L      Alkaline Phosphatase 67 U/L      Total Protein 8.2 g/dL      Albumin 4.7 g/dL      Total Bilirubin 0.59 mg/dL      eGFR 104 ml/min/1.73sq m     Narrative:      National Kidney Disease Foundation guidelines for Chronic Kidney Disease (CKD):     Stage 1 with normal or high GFR (GFR > 90 mL/min/1.73 square meters)    Stage 2 Mild CKD (GFR = 60-89 mL/min/1.73 square meters)    Stage 3A Moderate CKD (GFR = 45-59 mL/min/1.73 square meters)    Stage 3B Moderate CKD (GFR = 30-44 mL/min/1.73 square meters)    Stage 4 Severe CKD (GFR = 15-29 mL/min/1.73 square meters)    Stage 5 End Stage CKD (GFR <15 mL/min/1.73 square meters)  Note: GFR calculation is accurate only with a steady state creatinine            CTA dissection protocol chest abdomen pelvis w wo contrast   Final Interpretation by Dontae Galvez MD (12/02 3393)      1.  No acute findings in the chest, abdomen or pelvis.   2.  Hepatomegaly with diffuse hepatic steatosis.                Workstation performed: [id number]         XR chest 2 views   ED Interpretation by Dana Faith DO (12/02 2312)   No acute cardiopulmonary process visualized.          ECG 12 Lead Documentation Only    Date/Time: 12/2/2024 10:03 PM    Performed by: Dana Faith DO  Authorized by: Dana Faith DO    Patient location:  ED  Previous ECG:     Previous ECG:  Compared to current    Similarity:  Changes noted  Interpretation:     Interpretation: non-specific    Quality:     Tracing quality:  Limited by artifact  Rate:     ECG rate:  115    ECG rate assessment: tachycardic    Rhythm:     Rhythm: sinus tachycardia    Ectopy:     Ectopy: none    QRS:     QRS axis:  Right    QRS intervals:  Normal  Conduction:     Conduction: normal    ST segments:     ST segments:  Normal  T waves:     T waves: normal        ED Medication and Procedure Management   Prior to Admission Medications   Prescriptions Last Dose Informant Patient Reported? Taking?   methylPREDNISolone 4 MG tablet therapy pack   No No   Sig: Use as directed on package   omeprazole (PriLOSEC OTC) 20 MG tablet   No No   Sig: Take 1 tablet (20 mg total) by mouth daily   omeprazole (PriLOSEC) 20 mg delayed release capsule   No No   Sig: Take 1 capsule (20 mg total) by mouth daily      Facility-Administered Medications: None     Discharge Medication List as of 12/3/2024 12:13 AM        CONTINUE these medications which have NOT CHANGED    Details   methylPREDNISolone 4 MG tablet therapy pack Use as directed on package, Normal      omeprazole (PriLOSEC OTC) 20 MG tablet Take 1 tablet (20 mg total) by mouth daily, Starting Tue 9/17/2024, Normal      omeprazole (PriLOSEC) 20 mg delayed release capsule Take 1 capsule (20 mg total) by mouth daily, Starting Wed 9/25/2024, Normal           No discharge procedures on file.  ED SEPSIS DOCUMENTATION   Time reflects when diagnosis was documented in both MDM as applicable and the Disposition within this note        Time User Action Codes Description Comment    12/3/2024 12:09 AM Dana Faith Add [R07.9] Chest pain     12/3/2024 12:10 AM Dana Faith Add [R00.2] Palpitations     12/3/2024 12:10 AM Dana Faith Add [E83.42] Hypomagnesemia     12/3/2024 12:10 AM Dana Faith Add [E87.6] Hypokalemia                  Dana Faith DO  12/03/24 0721

## 2024-12-04 ENCOUNTER — NURSE TRIAGE (OUTPATIENT)
Age: 39
End: 2024-12-04

## 2024-12-04 ENCOUNTER — OFFICE VISIT (OUTPATIENT)
Age: 39
End: 2024-12-04
Payer: COMMERCIAL

## 2024-12-04 VITALS
HEIGHT: 68 IN | SYSTOLIC BLOOD PRESSURE: 138 MMHG | BODY MASS INDEX: 37.89 KG/M2 | DIASTOLIC BLOOD PRESSURE: 82 MMHG | HEART RATE: 90 BPM | WEIGHT: 250 LBS | OXYGEN SATURATION: 98 %

## 2024-12-04 DIAGNOSIS — R00.0 RAPID HEART RATE: ICD-10-CM

## 2024-12-04 DIAGNOSIS — R03.0 ELEVATED BP WITHOUT DIAGNOSIS OF HYPERTENSION: ICD-10-CM

## 2024-12-04 DIAGNOSIS — R07.9 CHEST PAIN, UNSPECIFIED TYPE: Primary | ICD-10-CM

## 2024-12-04 PROCEDURE — 93000 ELECTROCARDIOGRAM COMPLETE: CPT | Performed by: INTERNAL MEDICINE

## 2024-12-04 PROCEDURE — 99203 OFFICE O/P NEW LOW 30 MIN: CPT | Performed by: INTERNAL MEDICINE

## 2024-12-04 NOTE — PROGRESS NOTES
Cardiology Consultation  [facility]      Grant Shannon  1985  [id number]  [facility] [address]  [address] [facility] [address]  [place] [facility] [address]  [phone]    1. Chest pain, unspecified type  POCT ECG    Stress test only, exercise    Echo complete w/ contrast if indicated    Holter monitor      2. Rapid heart rate  POCT ECG    Stress test only, exercise    Echo complete w/ contrast if indicated    Holter monitor      3. Elevated BP without diagnosis of hypertension  Stress test only, exercise    Echo complete w/ contrast if indicated    Holter monitor             Discussion/Summary    Chest  pain, clinically noncardiac, but did go to the emergency department, normal troponins, then recurrent and constant now and required/prompted this visit.  2.  Sensation of fast heart rate at work      Plan  1) patient has had an ED visit with serial troponins for constant discomfort in his chest with no specific or aggravating or relieving factors.  CTA chest no evidence of dissection.  Unclear if this is musculoskeletal or related to anxiety.  He is anxious and has a lot of stress at work.  Have ordered stress test, Holter monitor and echocardiogram and will see him back afterwards.  He can take Tylenol as needed in the meantime.        History:     39-year-old male constant chest discomfort described as a pressure.  This prompted an ED visit and ultimately serial troponins and a CTA rule out dissection.    He was discharged from the emergency department with no findings.    Had recurrent pain, and prompted calling for appointment today.    Patient describes an uncomfortable feeling and a pressure in his chest which is constant.  No specific aggravating or relieving factors.    He has been anxious at work.    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  This was unusual for him.  2 caffeinated  beverages daily.  Nothing else out of the ordinary regarding energy drinks.        Patient Active Problem List   Diagnosis    Acute bronchitis    Diverticulitis of large intestine with perforation without abscess or bleeding    Sepsis without acute organ dysfunction (HCC)    Gastroesophageal reflux disease    Other examination of ears and hearing     Past Medical History:   Diagnosis Date    Diverticulitis     Diverticulitis of colon 2024     Social History     Socioeconomic History    Marital status: /Civil Union     Spouse name: Not on file    Number of children: Not on file    Years of education: Not on file    Highest education level: Not on file   Occupational History    Not on file   Tobacco Use    Smoking status: Former     Current packs/day: 0.00     Types: Cigarettes     Start date: 11/10/2007     Quit date: 2024     Years since quittin.5    Smokeless tobacco: Former     Types: Chew     Quit date: 2024   Vaping Use    Vaping status: Never Used   Substance and Sexual Activity    Alcohol use: Not Currently     Comment: 1-2 YEARS AGO    Drug use: Never    Sexual activity: Yes     Partners: Female     Birth control/protection: None   Other Topics Concern    Not on file   Social History Narrative    Not on file     Social Drivers of Health     Financial Resource Strain: Not on file   Food Insecurity: No Food Insecurity (2024)    Nursing - Inadequate Food Risk Classification     Worried About Running Out of Food in the Last Year: Never true     Ran Out of Food in the Last Year: Never true     Ran Out of Food in the Last Year: Not on file   Transportation Needs: No Transportation Needs (2024)    PRAPARE - Transportation     Lack of Transportation (Medical): No     Lack of Transportation (Non-Medical): No   Physical Activity: Not on file   Stress: Not on file   Social Connections: Not on file   Intimate Partner Violence: Not on file   Housing Stability: Low Risk  (2024)     "Housing Stability Vital Sign     Unable to Pay for Housing in the Last Year: No     Number of Times Moved in the Last Year: 0     Homeless in the Last Year: No      Family History   Problem Relation Age of Onset    Cancer Maternal Grandfather      Past Surgical History:   Procedure Laterality Date    SHOULDER SURGERY Right        Current Outpatient Medications:     omeprazole (PriLOSEC OTC) 20 MG tablet, Take 1 tablet (20 mg total) by mouth daily, Disp: 90 tablet, Rfl: 4    methylPREDNISolone 4 MG tablet therapy pack, Use as directed on package (Patient not taking: Reported on 12/4/2024), Disp: 21 tablet, Rfl: 0    omeprazole (PriLOSEC) 20 mg delayed release capsule, Take 1 capsule (20 mg total) by mouth daily (Patient not taking: Reported on 12/4/2024), Disp: 30 capsule, Rfl: 4  Allergies   Allergen Reactions    Other Sneezing     Cats       Social, Family and medication history as listed, reviewed and updated as necessary    Labs:   Lab Results   Component Value Date    K 3.4 (L) 12/02/2024     12/02/2024    CO2 28 12/02/2024    BUN 10 12/02/2024    CREATININE 0.92 12/02/2024    CREATININE 0.90 07/10/2024    CALCIUM 8.8 12/02/2024       Lab Results   Component Value Date    WBC 10.00 12/02/2024    HGB 15.3 12/02/2024    HGB 12.2 07/10/2024    HCT 45.7 12/02/2024    HCT 37.2 07/10/2024     12/02/2024     07/10/2024       No results found for: \"CHOL\"  No results found for: \"HDL\"  No results found for: \"LDLCALC\"  No results found for: \"TRIG\"  No results found for: \"LDLDIRECT\"    Lab Results   Component Value Date    ALT 37 12/02/2024    ALT 21 07/08/2024    AST 19 12/02/2024    AST 13 07/08/2024    ALKPHOS 67 12/02/2024    ALKPHOS 65 07/08/2024             No results found for: \"NTBNP\"    No results found for: \"HGBA1C\"    Imaging: Reviewed in epic      Review of Systems:  14 systems reviewed and negative with exception of the above       PHYSICAL EXAM:        Vitals:    12/04/24 1357   BP: 138/82 "   Pulse: 90   SpO2: 98%     Body mass index is 38.01 kg/m².  Weight (last 2 days)       Date/Time Weight    12/04/24 1357 113 (250)               Gen: No acute distress  HEENT: anicteric, mucous membranes moist  Neck: supple, no jugular venous distention, or carotid bruit  Heart: regular, normal s1 and s2, no murmur/rub or gallop  Lungs :clear to auscultation bilaterally, no rales/rhonchi or wheeze  Abdomen: soft nontender, normoactive bowel sounds, no organomegaly  Ext: warm and perfused, normal femoral pulses, no edema, or clubbing  Skin: warm, no rashes  Neuro: AAO x 3, no focal findings  Psychiatric: normal affect  Musculoskeletal: no obvious joint deformities.        This note was completed in part utilizing direct voice recognition software.   Grammatical errors, random word insertion, spelling mistakes, and incomplete sentences may be an occasional consequence of the system secondary to software limitations, ambient noise and hardware issues. At the time of dictation, efforts were made to edit, clarify and /or correct errors.  Please read the chart carefully and recognize, using context, where substitutions have occurred.  If you have any questions or concerns about the context, text or information contained within the body of this dictation, please contact myself, the provider, for further clarification.

## 2024-12-04 NOTE — TELEPHONE ENCOUNTER
"Reason for Conversation: received call from pt's wife and patient.  Pt was in the hospital 12/2-12/3.  He states he started having chest pain at work and it has been constant since then.  His HR was above 100 at this time as well.  Denies SOB.  Pt states in the hospital his potassium and magnesium levels were low.  They administered K and Mg to him.  Pt calls to schedule a new pt visit with cardiology.  Scheduled pt available opening today with Dr. Lewis.     VS/Weight: (Note: Please include date/time vitals/weight were measured)  12/2: 135/96 pulse 117    Pain: Yes     -Pain Score: 6    -Location: mid chest     -Radiation: back    -Duration: constant since 12/2    -Type (tightness, crushing etc.): spasm    Risk Factors: Other pt states grandmother has cardiac history, but unsure of specifics    Recent relevant testing and date of testing: Labs 12/2, CTA chest/ab/pelvis, cxr 12/2, ecg 12/2    Medication: methyl[prednisolone, Prilosec     Upcoming Office Visit: Yes    Last Office Visit: n/a      Reason for Disposition  • All other patients with chest pain (Exception: Fleeting chest pain lasting a few seconds.)    Answer Assessment - Initial Assessment Questions  1. LOCATION: \"Where does it hurt?\"        Mid chest   2. RADIATION: \"Does the pain go anywhere else?\" (e.g., into neck, jaw, arms, back)      Back   3. ONSET: \"When did the chest pain begin?\" (Minutes, hours or days)       Yesterday around 12 pm, had high HR on apple watch > 100 all day   4. PATTERN: \"Does the pain come and go, or has it been constant since it started?\"  \"Does it get worse with exertion?\"       Constant, denies worsening w/ exertion- resting since yesterday   5. DURATION: \"How long does it last\" (e.g., seconds, minutes, hours)      Constant since noon yesterday   6. SEVERITY: \"How bad is the pain?\"  (e.g., Scale 1-10; mild, moderate, or severe)      6/10  7. CARDIAC RISK FACTORS: \"Do you have any history of heart problems or risk factors " "for heart disease?\" (e.g., angina, prior heart attack; diabetes, high blood pressure, high cholesterol, smoker, or strong family history of heart disease)      Grandmother cardiac history   8. PULMONARY RISK FACTORS: \"Do you have any history of lung disease?\"  (e.g., blood clots in lung, asthma, emphysema, birth control pills)      Denies   9. CAUSE: \"What do you think is causing the chest pain?\"      Unsure   10. OTHER SYMPTOMS: \"Do you have any other symptoms?\" (e.g., dizziness, nausea, vomiting, sweating, fever, difficulty breathing, cough)        Denies    Protocols used: Chest Pain-Adult-[place]    "

## 2024-12-07 ENCOUNTER — OFFICE VISIT (OUTPATIENT)
Dept: URGENT CARE | Facility: CLINIC | Age: 39
End: 2024-12-07
Payer: COMMERCIAL

## 2024-12-07 VITALS
RESPIRATION RATE: 14 BRPM | TEMPERATURE: 98.8 F | WEIGHT: 255 LBS | HEART RATE: 120 BPM | BODY MASS INDEX: 38.77 KG/M2 | OXYGEN SATURATION: 96 %

## 2024-12-07 DIAGNOSIS — B02.9 HERPES ZOSTER WITHOUT COMPLICATION: Primary | ICD-10-CM

## 2024-12-07 PROCEDURE — 99213 OFFICE O/P EST LOW 20 MIN: CPT | Performed by: PHYSICIAN ASSISTANT

## 2024-12-07 RX ORDER — VALACYCLOVIR HYDROCHLORIDE 1 G/1
1000 TABLET, FILM COATED ORAL 3 TIMES DAILY
Qty: 21 TABLET | Refills: 0 | Status: SHIPPED | OUTPATIENT
Start: 2024-12-07 | End: 2024-12-14

## 2024-12-07 NOTE — PROGRESS NOTES
[facility]        NAME: Grant Shannon is a 39 y.o. male  : 1985    MRN: [id number]  DATE: 2024  TIME: 12:21 PM    Assessment and Plan   Herpes zoster without complication [B02.9]  1. Herpes zoster without complication  valACYclovir (VALTREX) 1,000 mg tablet            Patient Instructions   Shingles  Rx Valtrex 3 times a day x 7 days, sent via EMR  Keep covered and stay away from people who have not had chickenpox or varicella vaccine  Tylenol/ibuprofen as needed for pain    Follow up with PCP in 3-5 days.  Proceed to  ER if symptoms worsen.    If tests have been performed at [facility], our office will contact you with results if changes need to be made to the care plan discussed with you at the visit.  [facility]    Chief Complaint     Chief Complaint   Patient presents with    Rash     Pt presents rash on left breast area and left upper back // states pain at sites on Monday// went to ER r/o cardio malady // Cardiologist r/t heart problem on Wednesday         History of Present Illness       Grant is a 39-year-old male who presents to clinic complaining of a rash on his left chest and left back x 2 days.  He states it started yesterday however earlier in the week had some pain in that area.  He states the rash is irritated and feels like a burning sensation. He denies any itching, fever, or chills.  He denies any bleeding or discharge from the rash.        Review of Systems   Review of Systems   Constitutional:  Negative for chills and fever.   Skin:  Positive for rash.   Neurological:  Negative for numbness.         Current Medications       Current Outpatient Medications:     omeprazole (PriLOSEC OTC) 20 MG tablet, Take 1 tablet (20 mg total) by mouth daily, Disp: 90 tablet, Rfl: 4    valACYclovir (VALTREX) 1,000 mg tablet, Take 1 tablet (1,000 mg total) by mouth 3 (three) times a day for 7 days, Disp: 21 tablet, Rfl: 0     methylPREDNISolone 4 MG tablet therapy pack, Use as directed on package (Patient not taking: Reported on 12/4/2024), Disp: 21 tablet, Rfl: 0    omeprazole (PriLOSEC) 20 mg delayed release capsule, Take 1 capsule (20 mg total) by mouth daily (Patient not taking: Reported on 12/4/2024), Disp: 30 capsule, Rfl: 4    Current Allergies     Allergies as of 12/07/2024 - Reviewed 12/07/2024   Allergen Reaction Noted    Other Sneezing 06/21/2024            The following portions of the patient's history were reviewed and updated as appropriate: allergies, current medications, past family history, past medical history, past social history, past surgical history and problem list.     Past Medical History:   Diagnosis Date    Diverticulitis     Diverticulitis of colon 06/2024    GERD (gastroesophageal reflux disease)        Past Surgical History:   Procedure Laterality Date    SHOULDER SURGERY Right        Family History   Problem Relation Age of Onset    Cancer Maternal Grandfather          Medications have been verified.        Objective   Pulse (!) 120   Temp 98.8 °F (37.1 °C)   Resp 14   Wt 116 kg (255 lb)   SpO2 96%   BMI 38.77 kg/m²   No LMP for male patient.       Physical Exam     Physical Exam  Vitals and nursing note reviewed.   Constitutional:       General: He is not in acute distress.     Appearance: Normal appearance. He is not ill-appearing.   Pulmonary:      Effort: Pulmonary effort is normal.   Skin:     Findings: Rash present. Rash is papular and vesicular.          Neurological:      Mental Status: He is alert and oriented to person, place, and time.   Psychiatric:         Mood and Affect: Mood normal.         Behavior: Behavior normal.

## 2024-12-11 ENCOUNTER — TELEPHONE (OUTPATIENT)
Age: 39
End: 2024-12-11

## 2024-12-11 ENCOUNTER — TELEPHONE (OUTPATIENT)
Dept: CARDIAC SURGERY | Facility: CLINIC | Age: 39
End: 2024-12-11

## 2024-12-11 NOTE — TELEPHONE ENCOUNTER
Called patient to reschedule tomorrows follow up visit w/  due to testing scheduled after follow up. Echo/stress/Holter scheduled 12/26. First and second week of Jan is available. He canceled testings on the 12/10 due to being sick.

## 2024-12-11 NOTE — TELEPHONE ENCOUNTER
Left voicemail for patient to reschedule his appt with Dr. Lewis as the testing needed was rescheduled to 12/26/24. We can get him an appt with Dr. Lewis after that date.

## 2024-12-16 ENCOUNTER — TELEPHONE (OUTPATIENT)
Age: 39
End: 2024-12-16

## 2025-01-07 DIAGNOSIS — Z00.6 ENCOUNTER FOR EXAMINATION FOR NORMAL COMPARISON OR CONTROL IN CLINICAL RESEARCH PROGRAM: ICD-10-CM

## 2025-04-10 ENCOUNTER — OFFICE VISIT (OUTPATIENT)
Dept: FAMILY MEDICINE CLINIC | Facility: CLINIC | Age: 40
End: 2025-04-10
Payer: COMMERCIAL

## 2025-04-10 VITALS
DIASTOLIC BLOOD PRESSURE: 82 MMHG | HEIGHT: 68 IN | RESPIRATION RATE: 16 BRPM | SYSTOLIC BLOOD PRESSURE: 122 MMHG | TEMPERATURE: 97 F | HEART RATE: 100 BPM | WEIGHT: 260 LBS | BODY MASS INDEX: 39.4 KG/M2

## 2025-04-10 DIAGNOSIS — Z13.6 SCREENING FOR CARDIOVASCULAR CONDITION: ICD-10-CM

## 2025-04-10 DIAGNOSIS — Z00.00 ROUTINE ADULT HEALTH MAINTENANCE: Primary | ICD-10-CM

## 2025-04-10 DIAGNOSIS — E66.9 OBESITY (BMI 30-39.9): ICD-10-CM

## 2025-04-10 DIAGNOSIS — Z11.59 NEED FOR HEPATITIS C SCREENING TEST: ICD-10-CM

## 2025-04-10 DIAGNOSIS — K76.0 HEPATIC STEATOSIS: ICD-10-CM

## 2025-04-10 DIAGNOSIS — R29.818 SUSPECTED SLEEP APNEA: ICD-10-CM

## 2025-04-10 DIAGNOSIS — Z12.5 SCREENING PSA (PROSTATE SPECIFIC ANTIGEN): ICD-10-CM

## 2025-04-10 PROBLEM — J20.9 ACUTE BRONCHITIS: Status: RESOLVED | Noted: 2018-12-23 | Resolved: 2025-04-10

## 2025-04-10 PROBLEM — Z01.10 OTHER EXAMINATION OF EARS AND HEARING: Status: RESOLVED | Noted: 2024-11-18 | Resolved: 2025-04-10

## 2025-04-10 PROBLEM — A41.9 SEPSIS WITHOUT ACUTE ORGAN DYSFUNCTION (HCC): Status: RESOLVED | Noted: 2024-06-21 | Resolved: 2025-04-10

## 2025-04-10 PROBLEM — L80 VITILIGO: Status: ACTIVE | Noted: 2025-04-10

## 2025-04-10 PROCEDURE — 99396 PREV VISIT EST AGE 40-64: CPT | Performed by: NURSE PRACTITIONER

## 2025-04-10 NOTE — ASSESSMENT & PLAN NOTE
Pt inquiring about GLP-1's, we reviewed use and potential side effects.  He is worried about GI problems given hx of diverticulitis.

## 2025-04-10 NOTE — PROGRESS NOTES
"Name: Grant Shannon      : 1985      MRN: [id number]  Encounter Provider: MARINA Whitfield  Encounter Date: 4/10/2025   Encounter department: [facility]  :  Assessment & Plan           History of Present Illness {?Quick Links Encounters * My Last Note * Last Note in Specialty * Snapshot * Since Last Visit * History :22180}  HPI  Review of Systems    Objective {?Quick Links Trend Vitals * Enter New Vitals * Results Review * Timeline (Adult) * Labs * Imaging * Cardiology * Procedures * Lung Cancer Screening * Surgical eConsent :56976}  /82   Pulse 100   Temp (!) 97 °F (36.1 °C)   Resp 16   Ht 5' 8\" (1.727 m)   Wt 118 kg (260 lb)   BMI 39.53 kg/m²      Physical Exam  {Administrative / Billing Section (Optional):68108}  "

## 2025-04-10 NOTE — PROGRESS NOTES
Adult Annual Physical  Name: Grant Shannon      : 1985      MRN: [id number]  Encounter Provider: MARINA Whitfield  Encounter Date: 4/10/2025   Encounter department: [facility]    :  Assessment & Plan  Routine adult health maintenance         Suspected sleep apnea    Orders:  •  Ambulatory Referral to Sleep Medicine; Future    Screening PSA (prostate specific antigen)    Orders:  •  PSA, Total Screen; Future    Screening for cardiovascular condition    Orders:  •  Hemoglobin A1C; Future  •  Lipid panel; Future  •  Comprehensive metabolic panel; Future    Hepatic steatosis  Reviewed prior imaging and labs, discussed importance of weight loss and exercise       Obesity (BMI 30-39.9)  Pt inquiring about GLP-1's, we reviewed use and potential side effects.  He is worried about GI problems given hx of diverticulitis.           Need for hepatitis C screening test    Orders:  •  Hepatitis C antibody; Future        Preventive Screenings:  - Diabetes Screening: screening up-to-date  - Colon cancer screening: screening up-to-date   - Lung cancer screening: screening not indicated   - Prostate cancer screening: screening not indicated     Immunizations:  - Immunizations due: Influenza    Counseling/Anticipatory Guidance:    - Diet: discussed recommendations for a healthy/well-balanced diet.   - Exercise: the importance of regular exercise/physical activity was discussed. Recommend exercise 3-5 times per week for at least 30 minutes.       Depression Screening and Follow-up Plan: Patient was screened for depression during today's encounter. They screened negative with a PHQ-2 score of 0.          History of Present Illness     Adult Annual Physical:  Patient presents for annual physical. Here today to establish care, PCP is retiring.    Hx shingles- was having chest pains, prompting cardiac eval, but then rash appeared.  Hx diverticulitis- recently underwent colonoscopy.    Concern for ANTONIA- he  has daytime somnolence and witness apneic episodes.     Diet and Physical Activity:  - Diet/Nutrition:. eats twice per day based on work schedule, not necessarily healthy foods.  Rotates shift work  - Exercise: 3-4 times a week on average. goes the gym between 2-4 days per week    Depression Screening:  - PHQ-2 Score: 0    General Health:  - Sleep: 4-6 hours of sleep on average, sleeps poorly, snores loudly and witnessed apnea.  - Hearing: normal hearing bilateral ears.  - Vision: vision problems.  - Dental: regular dental visits.    Review of Systems   Constitutional: Negative.    Respiratory: Negative.     Cardiovascular: Negative.    Gastrointestinal: Negative.    Genitourinary: Negative.    Neurological: Negative.      Current Outpatient Medications on File Prior to Visit   Medication Sig Dispense Refill   • valACYclovir (VALTREX) 1,000 mg tablet Take 1 tablet (1,000 mg total) by mouth 3 (three) times a day for 7 days 21 tablet 0   • [DISCONTINUED] omeprazole (PriLOSEC OTC) 20 MG tablet Take 1 tablet (20 mg total) by mouth daily 90 tablet 4   • omeprazole (PriLOSEC) 20 mg delayed release capsule Take 1 capsule (20 mg total) by mouth daily (Patient not taking: Reported on 2024) 30 capsule 4   • [DISCONTINUED] methylPREDNISolone 4 MG tablet therapy pack Use as directed on package (Patient not taking: Reported on 2024) 21 tablet 0     No current facility-administered medications on file prior to visit.      Social History     Tobacco Use   • Smoking status: Former     Current packs/day: 0.00     Types: Cigarettes     Start date: 11/10/2007     Quit date: 2024     Years since quittin.8     Passive exposure: Past   • Smokeless tobacco: Former     Types: Chew     Quit date: 2024   Vaping Use   • Vaping status: Never Used   Substance and Sexual Activity   • Alcohol use: Yes     Comment: 1-2 YEARS AGO   • Drug use: Never   • Sexual activity: Yes     Partners: Female     Birth control/protection:  "None       Objective   /82   Pulse 100   Temp (!) 97 °F (36.1 °C)   Resp 16   Ht 5' 8\" (1.727 m)   Wt 118 kg (260 lb)   BMI 39.53 kg/m²     Physical Exam  Vitals and nursing note reviewed.   Constitutional:       Appearance: Normal appearance. He is well-developed. He is obese.   HENT:      Head: Normocephalic and atraumatic.      Right Ear: Tympanic membrane and external ear normal.      Left Ear: Tympanic membrane and external ear normal.      Nose: Nose normal.      Mouth/Throat:      Pharynx: Oropharynx is clear.   Eyes:      Extraocular Movements: Extraocular movements intact.      Conjunctiva/sclera: Conjunctivae normal.      Pupils: Pupils are equal, round, and reactive to light.   Neck:      Vascular: No carotid bruit.   Cardiovascular:      Rate and Rhythm: Normal rate and regular rhythm.      Pulses: Normal pulses.      Heart sounds: Normal heart sounds. No murmur heard.  Pulmonary:      Effort: Pulmonary effort is normal.      Breath sounds: Normal breath sounds.   Abdominal:      General: Bowel sounds are normal.      Palpations: Abdomen is soft.      Tenderness: There is no abdominal tenderness.      Hernia: No hernia is present.   Musculoskeletal:         General: No deformity. Normal range of motion.      Cervical back: Normal range of motion and neck supple.   Skin:     General: Skin is warm and dry.      Capillary Refill: Capillary refill takes less than 2 seconds.   Neurological:      Mental Status: He is alert and oriented to person, place, and time.      Sensory: No sensory deficit.      Coordination: Coordination normal.      Deep Tendon Reflexes: Reflexes normal.   Psychiatric:         Mood and Affect: Mood normal.         Behavior: Behavior normal.         "

## 2025-06-06 ENCOUNTER — APPOINTMENT (EMERGENCY)
Dept: CT IMAGING | Facility: HOSPITAL | Age: 40
End: 2025-06-06
Payer: COMMERCIAL

## 2025-06-06 ENCOUNTER — HOSPITAL ENCOUNTER (EMERGENCY)
Facility: HOSPITAL | Age: 40
Discharge: HOME | End: 2025-06-06
Attending: EMERGENCY MEDICINE
Payer: COMMERCIAL

## 2025-06-06 VITALS
SYSTOLIC BLOOD PRESSURE: 125 MMHG | OXYGEN SATURATION: 97 % | TEMPERATURE: 98.7 F | HEART RATE: 106 BPM | DIASTOLIC BLOOD PRESSURE: 69 MMHG | RESPIRATION RATE: 18 BRPM

## 2025-06-06 DIAGNOSIS — K57.92 DIVERTICULITIS: Primary | ICD-10-CM

## 2025-06-06 DIAGNOSIS — K76.0 HEPATIC STEATOSIS: ICD-10-CM

## 2025-06-06 LAB
ALBUMIN SERPL BCG-MCNC: 4.6 G/DL (ref 3.5–5)
ALP SERPL-CCNC: 64 U/L (ref 34–104)
ALT SERPL W P-5'-P-CCNC: 31 U/L (ref 7–52)
ANION GAP SERPL CALCULATED.3IONS-SCNC: 10 MMOL/L (ref 4–13)
AST SERPL W P-5'-P-CCNC: 19 U/L (ref 13–39)
BASOPHILS # BLD AUTO: 0.07 THOUSANDS/ÂΜL (ref 0–0.1)
BASOPHILS NFR BLD AUTO: 1 % (ref 0–1)
BILIRUB SERPL-MCNC: 1.06 MG/DL (ref 0.2–1)
BILIRUB UR QL STRIP: NEGATIVE
BUN SERPL-MCNC: 11 MG/DL (ref 5–25)
CALCIUM SERPL-MCNC: 9.1 MG/DL (ref 8.4–10.2)
CHLORIDE SERPL-SCNC: 104 MMOL/L (ref 96–108)
CLARITY UR: CLEAR
CO2 SERPL-SCNC: 24 MMOL/L (ref 21–32)
COLOR UR: YELLOW
CREAT SERPL-MCNC: 1 MG/DL (ref 0.6–1.3)
EOSINOPHIL # BLD AUTO: 0.12 THOUSAND/ÂΜL (ref 0–0.61)
EOSINOPHIL NFR BLD AUTO: 1 % (ref 0–6)
ERYTHROCYTE [DISTWIDTH] IN BLOOD BY AUTOMATED COUNT: 12.6 % (ref 11.6–15.1)
GFR SERPL CREATININE-BSD FRML MDRD: 93 ML/MIN/1.73SQ M
GLUCOSE SERPL-MCNC: 93 MG/DL (ref 65–140)
GLUCOSE UR STRIP-MCNC: NEGATIVE MG/DL
HCT VFR BLD AUTO: 42.3 % (ref 36.5–49.3)
HGB BLD-MCNC: 14.5 G/DL (ref 12–17)
HGB UR QL STRIP.AUTO: NEGATIVE
IMM GRANULOCYTES # BLD AUTO: 0.04 THOUSAND/UL (ref 0–0.2)
IMM GRANULOCYTES NFR BLD AUTO: 0 % (ref 0–2)
KETONES UR STRIP-MCNC: NEGATIVE MG/DL
LEUKOCYTE ESTERASE UR QL STRIP: NEGATIVE
LIPASE SERPL-CCNC: 20 U/L (ref 11–82)
LYMPHOCYTES # BLD AUTO: 2.11 THOUSANDS/ÂΜL (ref 0.6–4.47)
LYMPHOCYTES NFR BLD AUTO: 15 % (ref 14–44)
MCH RBC QN AUTO: 27.6 PG (ref 26.8–34.3)
MCHC RBC AUTO-ENTMCNC: 34.3 G/DL (ref 31.4–37.4)
MCV RBC AUTO: 81 FL (ref 82–98)
MONOCYTES # BLD AUTO: 1.2 THOUSAND/ÂΜL (ref 0.17–1.22)
MONOCYTES NFR BLD AUTO: 9 % (ref 4–12)
NEUTROPHILS # BLD AUTO: 10.17 THOUSANDS/ÂΜL (ref 1.85–7.62)
NEUTS SEG NFR BLD AUTO: 74 % (ref 43–75)
NITRITE UR QL STRIP: NEGATIVE
NRBC BLD AUTO-RTO: 0 /100 WBCS
PH UR STRIP.AUTO: 6.5 [PH]
PLATELET # BLD AUTO: 258 THOUSANDS/UL (ref 149–390)
PMV BLD AUTO: 11.7 FL (ref 8.9–12.7)
POTASSIUM SERPL-SCNC: 3.3 MMOL/L (ref 3.5–5.3)
PROT SERPL-MCNC: 8.4 G/DL (ref 6.4–8.4)
PROT UR STRIP-MCNC: NEGATIVE MG/DL
RBC # BLD AUTO: 5.25 MILLION/UL (ref 3.88–5.62)
SODIUM SERPL-SCNC: 138 MMOL/L (ref 135–147)
SP GR UR STRIP.AUTO: 1.02 (ref 1–1.03)
UROBILINOGEN UR STRIP-ACNC: 2 MG/DL
WBC # BLD AUTO: 13.71 THOUSAND/UL (ref 4.31–10.16)

## 2025-06-06 PROCEDURE — 80053 COMPREHEN METABOLIC PANEL: CPT | Performed by: EMERGENCY MEDICINE

## 2025-06-06 PROCEDURE — 74177 CT ABD & PELVIS W/CONTRAST: CPT

## 2025-06-06 PROCEDURE — 85025 COMPLETE CBC W/AUTO DIFF WBC: CPT | Performed by: EMERGENCY MEDICINE

## 2025-06-06 PROCEDURE — 83690 ASSAY OF LIPASE: CPT | Performed by: EMERGENCY MEDICINE

## 2025-06-06 PROCEDURE — 36415 COLL VENOUS BLD VENIPUNCTURE: CPT | Performed by: EMERGENCY MEDICINE

## 2025-06-06 PROCEDURE — 96360 HYDRATION IV INFUSION INIT: CPT

## 2025-06-06 PROCEDURE — 99284 EMERGENCY DEPT VISIT MOD MDM: CPT

## 2025-06-06 PROCEDURE — 99285 EMERGENCY DEPT VISIT HI MDM: CPT | Performed by: EMERGENCY MEDICINE

## 2025-06-06 PROCEDURE — 81003 URINALYSIS AUTO W/O SCOPE: CPT | Performed by: EMERGENCY MEDICINE

## 2025-06-06 RX ORDER — POTASSIUM CHLORIDE 1500 MG/1
20 TABLET, EXTENDED RELEASE ORAL ONCE
Status: COMPLETED | OUTPATIENT
Start: 2025-06-06 | End: 2025-06-06

## 2025-06-06 RX ORDER — HYDROCODONE BITARTRATE AND ACETAMINOPHEN 5; 325 MG/1; MG/1
1 TABLET ORAL ONCE
Refills: 0 | Status: COMPLETED | OUTPATIENT
Start: 2025-06-06 | End: 2025-06-06

## 2025-06-06 RX ORDER — HYDROCODONE BITARTRATE AND ACETAMINOPHEN 5; 325 MG/1; MG/1
1 TABLET ORAL EVERY 6 HOURS PRN
Qty: 10 TABLET | Refills: 0 | Status: SHIPPED | OUTPATIENT
Start: 2025-06-06 | End: 2025-06-16

## 2025-06-06 RX ADMIN — SODIUM CHLORIDE 1000 ML: 0.9 INJECTION, SOLUTION INTRAVENOUS at 16:48

## 2025-06-06 RX ADMIN — IOHEXOL 50 ML: 240 INJECTION, SOLUTION INTRATHECAL; INTRAVASCULAR; INTRAVENOUS; ORAL at 18:00

## 2025-06-06 RX ADMIN — IOHEXOL 100 ML: 350 INJECTION, SOLUTION INTRAVENOUS at 19:33

## 2025-06-06 RX ADMIN — POTASSIUM CHLORIDE 20 MEQ: 1500 TABLET, EXTENDED RELEASE ORAL at 17:49

## 2025-06-06 RX ADMIN — AMOXICILLIN AND CLAVULANATE POTASSIUM 1 TABLET: 875; 125 TABLET, FILM COATED ORAL at 21:23

## 2025-06-06 RX ADMIN — HYDROCODONE BITARTRATE AND ACETAMINOPHEN 1 TABLET: 5; 325 TABLET ORAL at 21:23

## 2025-06-06 NOTE — Clinical Note
Grant Shannon was seen and treated in our emergency department on 6/6/2025.                Diagnosis:     Grant  may return to work on return date.    He may return on this date: 06/08/2025         If you have any questions or concerns, please don't hesitate to call.      Rashmi Miranda, DO    ______________________________           _______________          _______________  Hospital Representative                              Date                                Time

## 2025-06-06 NOTE — ED PROVIDER NOTES
Time reflects when diagnosis was documented in both MDM as applicable and the Disposition within this note       Time User Action Codes Description Comment    6/6/2025  9:15 PM Rashmi Miranda Add [K57.92] Diverticulitis     6/6/2025  9:15 PM Rashmi Miranda Add [K76.0] Hepatic steatosis           ED Disposition       ED Disposition   Discharge    Condition   Stable    Date/Time   Fri Jun 6, 2025  9:14 PM    Comment   Grant Shannon discharge to home/self care.                   Assessment & Plan       Medical Decision Making  40-year-old male presents with 2-day history of fever and lower abdominal pain.  Differential diagnosis includes but is not limited to acute diverticulitis, cystitis, colitis, neoplasm, renal colic, inflammatory bowel disease.    Problems Addressed:  Diverticulitis: acute illness or injury  Hepatic steatosis: chronic illness or injury    Amount and/or Complexity of Data Reviewed  Independent Historian: spouse     Details: Patient's wife at bedside help provide history  External Data Reviewed: radiology.     Details: CT scan from previous episode of diverticulitis reviewed by me.  Labs: ordered.     Details: Labs ordered and independently interpreted by me, patient with leukocytosis likely secondary to acute infection.  Radiology: ordered.     Details: CT scan ordered by me interpreted by radiology    Risk  Prescription drug management.  Risk Details: Patient CT scan demonstrates acute sigmoid diverticulitis.  Patient informed that he should have follow-up colonoscopy when symptoms are resolved.  Will discharge on oral antibiotics and pain medication.  Recommend diet modification.  Discussed signs and symptoms to return to the emergency department.  Patient felt comfortable discharge plan.  Patient aware of diagnosis of hepatic steatosis from prior evaluations.             Medications   sodium chloride 0.9 % bolus 1,000 mL (0 mL Intravenous Stopped 6/6/25 1800)   potassium chloride (Klor-Con M20) CR  tablet 20 mEq (20 mEq Oral Given 6/6/25 1749)   iohexol (OMNIPAQUE) 350 MG/ML injection (MULTI-DOSE) 100 mL (100 mL Intravenous Given 6/6/25 1933)   iohexol (OMNIPAQUE) 240 MG/ML solution 50 mL (50 mL Oral Given 6/6/25 1800)   amoxicillin-clavulanate (AUGMENTIN) 875-125 mg per tablet 1 tablet (1 tablet Oral Given 6/6/25 2123)   HYDROcodone-acetaminophen (NORCO) 5-325 mg per tablet 1 tablet (1 tablet Oral Given 6/6/25 2123)       ED Risk Strat Scores                    No data recorded        SBIRT 22yo+      Flowsheet Row Most Recent Value   Initial Alcohol Screen: US AUDIT-C     1. How often do you have a drink containing alcohol? 0 Filed at: 06/06/2025 1636   2. How many drinks containing alcohol do you have on a typical day you are drinking?  0 Filed at: 06/06/2025 1636   3a. Male UNDER 65: How often do you have five or more drinks on one occasion? 0 Filed at: 06/06/2025 1636   Audit-C Score 0 Filed at: 06/06/2025 1636   ANGELES: How many times in the past year have you...    Used an illegal drug or used a prescription medication for non-medical reasons? Never Filed at: 06/06/2025 1636                            History of Present Illness       Chief Complaint   Patient presents with    Abdominal Pain     Hx of diverticulitis, decreased bowel movements, cramping RLQ abd pain        Past Medical History[1]   Past Surgical History[2]   Family History[3]   Social History[4]   E-Cigarette/Vaping    E-Cigarette Use Never User       E-Cigarette/Vaping Substances    Nicotine No     THC No     CBD No     Flavoring No     Other No     Unknown No       I have reviewed and agree with the history as documented.     40-year-old male presents to the emergency department for evaluation of abdominal pain and fever.  Patient states that he began to feel flulike on Monday evening.  He did temp of 101.  He had some bodyaches and suspected that he was getting a cold, he took some cold medicine and went to sleep.  The next day he woke  up with continued symptoms and also noted some lower abdominal pain.  Patient states that he has been constipated this week and was able to finally pass a bowel movement yesterday.  No black or bloody stools noted.  He has had normal appetite.  The pain in the abdomen is in the mid lower portion and feels similar to what he recalls having with a prior bout of diverticulitis.  He notes that his urine has been dark orange in color.  No nausea or vomiting.  No dysuria or hematuria.      History provided by:  Patient and medical records  Abdominal Pain  Pain location:  RLQ and suprapubic  Pain quality: aching and dull    Pain radiates to:  Does not radiate  Pain severity:  Moderate  Onset quality:  Gradual  Duration:  3 days  Timing:  Constant  Progression:  Worsening  Chronicity:  New  Context: not diet changes, not previous surgeries, not recent illness, not sick contacts and not suspicious food intake    Relieved by:  Nothing  Worsened by:  Nothing  Ineffective treatments:  None tried  Associated symptoms: constipation and fever    Associated symptoms: no anorexia, no cough, no hematochezia, no hematuria, no nausea and no vomiting    Risk factors: has not had multiple surgeries and no recent hospitalization        Review of Systems   Constitutional:  Positive for fever.   Respiratory:  Negative for cough.    Gastrointestinal:  Positive for abdominal pain and constipation. Negative for anorexia, hematochezia, nausea and vomiting.   Genitourinary:  Negative for hematuria.   All other systems reviewed and are negative.          Objective       ED Triage Vitals   Temperature Pulse Blood Pressure Respirations SpO2 Patient Position - Orthostatic VS   06/06/25 1635 06/06/25 1635 06/06/25 1635 06/06/25 1635 06/06/25 1635 06/06/25 1635   98.7 °F (37.1 °C) (!) 115 122/73 18 97 % Sitting      Temp Source Heart Rate Source BP Location FiO2 (%) Pain Score    06/06/25 1635 -- 06/06/25 1635 -- 06/06/25 2123    Oral  Right arm  4       Vitals      Date and Time Temp Pulse SpO2 Resp BP Pain Score FACES Pain Rating User   06/06/25 2123 -- -- -- -- -- 4 -- KB   06/06/25 2040 -- 106 97 % -- 125/69 -- -- KB   06/06/25 1635 98.7 °F (37.1 °C) 115 97 % 18 122/73 -- -- KM            Physical Exam  Vitals reviewed.   Constitutional:       General: He is not in acute distress.     Appearance: Normal appearance. He is well-developed.   HENT:      Head: Normocephalic and atraumatic.     Eyes:      Conjunctiva/sclera: Conjunctivae normal.      Pupils: Pupils are equal, round, and reactive to light.       Cardiovascular:      Rate and Rhythm: Normal rate and regular rhythm.      Heart sounds: Normal heart sounds.   Pulmonary:      Effort: Pulmonary effort is normal. No accessory muscle usage or respiratory distress.      Breath sounds: Normal breath sounds.   Chest:      Chest wall: No tenderness.   Abdominal:      General: Bowel sounds are normal. There is no distension.      Palpations: Abdomen is soft.      Tenderness: There is abdominal tenderness in the suprapubic area. There is no guarding or rebound.      Hernia: No hernia is present.     Musculoskeletal:         General: No tenderness or deformity. Normal range of motion.      Cervical back: Normal range of motion and neck supple.   Lymphadenopathy:      Cervical: No cervical adenopathy.     Skin:     General: Skin is warm and dry.      Findings: No rash.     Neurological:      Mental Status: He is alert and oriented to person, place, and time.      Coordination: Coordination normal.      Deep Tendon Reflexes: Reflexes are normal and symmetric.     Psychiatric:         Behavior: Behavior normal.         Thought Content: Thought content normal.         Judgment: Judgment normal.         Results Reviewed       Procedure Component Value Units Date/Time    Comprehensive metabolic panel [[id number]]  (Abnormal) Collected: 06/06/25 1646    Lab Status: Final result Specimen: Blood from Arm, Left Updated:  06/06/25 1724     Sodium 138 mmol/L      Potassium 3.3 mmol/L      Chloride 104 mmol/L      CO2 24 mmol/L      ANION GAP 10 mmol/L      BUN 11 mg/dL      Creatinine 1.00 mg/dL      Glucose 93 mg/dL      Calcium 9.1 mg/dL      AST 19 U/L      ALT 31 U/L      Alkaline Phosphatase 64 U/L      Total Protein 8.4 g/dL      Albumin 4.6 g/dL      Total Bilirubin 1.06 mg/dL      eGFR 93 ml/min/1.73sq m     Narrative:      National Kid[employer] Foundation guidelines for Chronic Kidney Disease (CKD):     Stage 1 with normal or high GFR (GFR > 90 mL/min/1.73 square meters)    Stage 2 Mild CKD (GFR = 60-89 mL/min/1.73 square meters)    Stage 3A Moderate CKD (GFR = 45-59 mL/min/1.73 square meters)    Stage 3B Moderate CKD (GFR = 30-44 mL/min/1.73 square meters)    Stage 4 Severe CKD (GFR = 15-29 mL/min/1.73 square meters)    Stage 5 End Stage CKD (GFR <15 mL/min/1.73 square meters)  Note: GFR calculation is accurate only with a steady state creatinine    Lipase [[id number]]  (Normal) Collected: 06/06/25 1646    Lab Status: Final result Specimen: Blood from Arm, Left Updated: 06/06/25 1724     Lipase 20 u/L     UA w Reflex to Microscopic w Reflex to Culture [[id number]]  (Abnormal) Collected: 06/06/25 1647    Lab Status: Final result Specimen: Urine, Clean Catch Updated: 06/06/25 1714     Color, UA Yellow     Clarity, UA Clear     Specific Gravity, UA 1.024     pH, UA 6.5     Leukocytes, UA Negative     Nitrite, UA Negative     Protein, UA Negative mg/dl      Glucose, UA Negative mg/dl      Ketones, UA Negative mg/dl      Urobilinogen, UA 2.0 mg/dl      Bilirubin, UA Negative     Occult Blood, UA Negative    CBC and differential [[id number]]  (Abnormal) Collected: 06/06/25 1646    Lab Status: Final result Specimen: Blood from Arm, Left Updated: 06/06/25 1659     WBC 13.71 Thousand/uL      RBC 5.25 Million/uL      Hemoglobin 14.5 g/dL      Hematocrit 42.3 %      MCV 81 fL      MCH 27.6 pg      MCHC 34.3 g/dL      RDW 12.6 %      MPV  11.7 fL      Platelets 258 Thousands/uL      nRBC 0 /100 WBCs      Segmented % 74 %      Immature Grans % 0 %      Lymphocytes % 15 %      Monocytes % 9 %      Eosinophils Relative 1 %      Basophils Relative 1 %      Absolute Neutrophils 10.17 Thousands/µL      Absolute Immature Grans 0.04 Thousand/uL      Absolute Lymphocytes 2.11 Thousands/µL      Absolute Monocytes 1.20 Thousand/µL      Eosinophils Absolute 0.12 Thousand/µL      Basophils Absolute 0.07 Thousands/µL             CT abdomen pelvis with contrast   Final Interpretation by Minesh Alfonso DO (06/06 2043)      Focal wall thickening and inflammatory change in the sigmoid colon. Findings most consistent with a focal colitis versus acute diverticulitis. Recommend colonoscopy when clinically appropriate to exclude underlying lesion.      Hepatomegaly and hepatic steatosis.      The study was marked in EPIC for immediate notification.      Workstation performed: [id number]             Procedures    ED Medication and Procedure Management   Prior to Admission Medications   Prescriptions Last Dose Informant Patient Reported? Taking?   omeprazole (PriLOSEC) 20 mg delayed release capsule  Self No No   Sig: Take 1 capsule (20 mg total) by mouth daily   Patient not taking: Reported on 12/4/2024   valACYclovir (VALTREX) 1,000 mg tablet   No No   Sig: Take 1 tablet (1,000 mg total) by mouth 3 (three) times a day for 7 days      Facility-Administered Medications: None     Discharge Medication List as of 6/6/2025  9:18 PM        START taking these medications    Details   amoxicillin-clavulanate (AUGMENTIN) 875-125 mg per tablet Take 1 tablet by mouth every 12 (twelve) hours for 10 days, Starting Fri 6/6/2025, Until Mon 6/16/2025, Normal      HYDROcodone-acetaminophen (NORCO) 5-325 mg per tablet Take 1 tablet by mouth every 6 (six) hours as needed for pain for up to 10 days Max Daily Amount: 4 tablets, Starting Fri 6/6/2025, Until Mon 6/16/2025 at 2359, Normal            CONTINUE these medications which have NOT CHANGED    Details   omeprazole (PriLOSEC) 20 mg delayed release capsule Take 1 capsule (20 mg total) by mouth daily, Starting Wed 2024, Normal      valACYclovir (VALTREX) 1,000 mg tablet Take 1 tablet (1,000 mg total) by mouth 3 (three) times a day for 7 days, Starting Sat 2024, Until Thu 4/10/2025, Normal           No discharge procedures on file.  ED SEPSIS DOCUMENTATION   Time reflects when diagnosis was documented in both MDM as applicable and the Disposition within this note       Time User Action Codes Description Comment    2025  9:15 PM Rashmi Miranda [K57.92] Diverticulitis     2025  9:15 PM Rashmi Miranda [K76.0] Hepatic steatosis                      [1]   Past Medical History:  Diagnosis Date    Diverticulitis     Diverticulitis of colon 2024    GERD (gastroesophageal reflux disease)     Sepsis without acute organ dysfunction (HCC) 2024   [2]   Past Surgical History:  Procedure Laterality Date    SHOULDER SURGERY Right     TONSILLECTOMY     [3]   Family History  Problem Relation Name Age of Onset    Liver disease Mother          fatty liver    No Known Problems Father          no relationship    No Known Problems Sister      No Known Problems Brother      Heart disease Maternal Grandmother      Prostate cancer Maternal Grandfather Angela    [4]   Social History  Tobacco Use    Smoking status: Former     Current packs/day: 0.00     Types: Cigarettes     Start date: 11/10/2007     Quit date: 2024     Years since quittin.0     Passive exposure: Past    Smokeless tobacco: Former     Types: Chew     Quit date: 2024   Vaping Use    Vaping status: Never Used   Substance Use Topics    Alcohol use: Yes     Comment: 1-2 YEARS AGO    Drug use: Never        Rashmi Miranda DO  [id number]

## 2025-06-08 ENCOUNTER — OFFICE VISIT (OUTPATIENT)
Dept: URGENT CARE | Facility: CLINIC | Age: 40
End: 2025-06-08
Payer: COMMERCIAL

## 2025-06-08 VITALS
DIASTOLIC BLOOD PRESSURE: 82 MMHG | HEIGHT: 68 IN | RESPIRATION RATE: 18 BRPM | TEMPERATURE: 97.5 F | OXYGEN SATURATION: 98 % | WEIGHT: 257 LBS | BODY MASS INDEX: 38.95 KG/M2 | SYSTOLIC BLOOD PRESSURE: 132 MMHG | HEART RATE: 90 BPM

## 2025-06-08 DIAGNOSIS — Z02.89 ENCOUNTER TO OBTAIN EXCUSE FROM WORK: Primary | ICD-10-CM

## 2025-06-08 PROCEDURE — 99213 OFFICE O/P EST LOW 20 MIN: CPT | Performed by: PHYSICIAN ASSISTANT

## 2025-06-08 NOTE — PROGRESS NOTES
[facility]        NAME: Grant Shannon is a 40 y.o. male  : 1985    MRN: [id number]  DATE: 2025  TIME: 12:00 PM    Assessment and Plan   Encounter to obtain excuse from work [Z02.89]  1. Encounter to obtain excuse from work            Patient is well-appearing.  Here for work note.  Work note provided.  Discussed with patient that if symptoms change or worsen he needs to go back to the emergency room.      Patient Instructions   There are no Patient Instructions on file for this visit.      Follow up with PCP in 3-5 days.  Proceed to  ER if symptoms worsen.    Chief Complaint     Chief Complaint   Patient presents with    needs note     Seen in er, still not feeling well         History of Present Illness       The patient is a 4-year-old male presenting today for work note.  Was seen in the ER on 2025 for diverticulitis.  Is taking Norco and Augmentin for diverticulitis.  Does report improvement of symptoms today but would like to miss 1 more day of work.        Review of Systems   Review of Systems   Constitutional:  Negative for activity change, appetite change, chills, diaphoresis and fever.   HENT:  Negative for congestion, ear pain, rhinorrhea and sore throat.    Eyes:  Negative for pain and visual disturbance.   Respiratory:  Negative for cough, chest tightness and shortness of breath.    Cardiovascular:  Negative for chest pain and palpitations.   Gastrointestinal:  Positive for abdominal pain. Negative for diarrhea, nausea and vomiting.   Genitourinary:  Negative for dysuria and hematuria.   Musculoskeletal:  Negative for arthralgias, back pain and myalgias.   Skin:  Negative for color change, pallor and rash.   Neurological:  Negative for seizures, syncope and headaches.   All other systems reviewed and are negative.        Current Medications     Current Medications[1]    Current Allergies     Allergies as of 2025 - Reviewed 2025   Allergen Reaction Noted     "Other Sneezing 06/21/2024    Pollen extract Other (See Comments) 04/10/2025            The following portions of the patient's history were reviewed and updated as appropriate: allergies, current medications, past family history, past medical history, past social history, past surgical history and problem list.     Past Medical History[2]    Past Surgical History[3]    Family History[4]      Medications have been verified.        Objective   /82   Pulse 90   Temp 97.5 °F (36.4 °C)   Resp 18   Ht 5' 8\" (1.727 m)   Wt 117 kg (257 lb)   SpO2 98%   BMI 39.08 kg/m²        Physical Exam     Physical Exam  Constitutional:       General: He is not in acute distress.     Appearance: Normal appearance. He is normal weight. He is not ill-appearing, toxic-appearing or diaphoretic.   HENT:      Head: Normocephalic and atraumatic.     Cardiovascular:      Rate and Rhythm: Normal rate and regular rhythm.      Heart sounds: Normal heart sounds. No murmur heard.     No friction rub. No gallop.   Pulmonary:      Effort: Pulmonary effort is normal. No respiratory distress.      Breath sounds: Normal breath sounds. No stridor. No wheezing, rhonchi or rales.   Chest:      Chest wall: No tenderness.   Abdominal:      General: Abdomen is flat. Bowel sounds are normal. There is no distension.      Palpations: Abdomen is soft. There is no mass.      Tenderness: There is no abdominal tenderness. There is no right CVA tenderness, left CVA tenderness, guarding or rebound.      Hernia: No hernia is present.     Musculoskeletal:      Cervical back: Normal range of motion.   Lymphadenopathy:      Cervical: No cervical adenopathy.     Skin:     General: Skin is warm and dry.      Capillary Refill: Capillary refill takes less than 2 seconds.     Neurological:      Mental Status: He is alert.                        [1]   Current Outpatient Medications:     amoxicillin-clavulanate (AUGMENTIN) 875-125 mg per tablet, Take 1 tablet by mouth " every 12 (twelve) hours for 10 days, Disp: 20 tablet, Rfl: 0    HYDROcodone-acetaminophen (NORCO) 5-325 mg per tablet, Take 1 tablet by mouth every 6 (six) hours as needed for pain for up to 10 days Max Daily Amount: 4 tablets, Disp: 10 tablet, Rfl: 0    omeprazole (PriLOSEC) 20 mg delayed release capsule, Take 1 capsule (20 mg total) by mouth daily, Disp: 30 capsule, Rfl: 4    valACYclovir (VALTREX) 1,000 mg tablet, Take 1 tablet (1,000 mg total) by mouth 3 (three) times a day for 7 days, Disp: 21 tablet, Rfl: 0  [2]   Past Medical History:  Diagnosis Date    Diverticulitis     Diverticulitis of colon 06/2024    GERD (gastroesophageal reflux disease)     Sepsis without acute organ dysfunction (HCC) 06/21/2024   [3]   Past Surgical History:  Procedure Laterality Date    SHOULDER SURGERY Right     TONSILLECTOMY     [4]   Family History  Problem Relation Name Age of Onset    Liver disease Mother          fatty liver    No Known Problems Father          no relationship    No Known Problems Sister      No Known Problems Brother      Heart disease Maternal Grandmother      Prostate cancer Maternal Grandfather Angela

## 2025-06-08 NOTE — LETTER
June 8, 2025     Patient: Grant Shannon  YOB: 1985  Date of Visit: 6/8/2025      To Whom it May Concern:    Grant Shannon is under my professional care. Grant was seen in my office on 6/8/2025. Grant may return to work on 6/11/25.    If you have any questions or concerns, please don't hesitate to call.         Sincerely,          Milana Beth PA-C        CC: No Recipients

## 2025-07-03 ENCOUNTER — OFFICE VISIT (OUTPATIENT)
Dept: SLEEP CENTER | Facility: CLINIC | Age: 40
End: 2025-07-03
Payer: COMMERCIAL

## 2025-07-03 VITALS
WEIGHT: 253 LBS | SYSTOLIC BLOOD PRESSURE: 118 MMHG | DIASTOLIC BLOOD PRESSURE: 78 MMHG | HEIGHT: 68 IN | OXYGEN SATURATION: 97 % | BODY MASS INDEX: 38.34 KG/M2 | HEART RATE: 96 BPM

## 2025-07-03 DIAGNOSIS — R06.83 SNORING: Primary | ICD-10-CM

## 2025-07-03 DIAGNOSIS — K21.9 GASTROESOPHAGEAL REFLUX DISEASE, UNSPECIFIED WHETHER ESOPHAGITIS PRESENT: ICD-10-CM

## 2025-07-03 DIAGNOSIS — R29.818 SUSPECTED SLEEP APNEA: ICD-10-CM

## 2025-07-03 DIAGNOSIS — G47.19 EXCESSIVE DAYTIME SLEEPINESS: ICD-10-CM

## 2025-07-03 PROCEDURE — 99204 OFFICE O/P NEW MOD 45 MIN: CPT | Performed by: STUDENT IN AN ORGANIZED HEALTH CARE EDUCATION/TRAINING PROGRAM

## 2025-07-03 NOTE — PATIENT INSTRUCTIONS
"- A sleep study was ordered for you. It can be an in lab sleep study or a portable at home sleep study. It depends on what insurance approves.  Some insurances will only cover home sleep studies unless you have significant medical conditions like stroke or heart attack within the last 6 months, severe COPD, or the use of supplemental oxygen.    - The order goes electronically to the sleep center staff.    - The sleep center will get approval from your insurance and then will call you to schedule the sleep study.    - If you do not hear from the sleep center in 1 week, please call us to check the status of your order.    - There are different locations to get sleep study done.    - Please make sure you know what is the copay associated with your sleep study. Approval does not mean coverage.     - Once your sleep study is done, it can take up to 2 weeks to get results (depending on the volume).    - A follow up appointment to discuss sleep study results is required in most cases. On that visit, we will discuss results and treatment options.    - If your sleep study shows severe sleep apnea please expect contact from the sleep center. We will try to expedite your follow up appointment.    - The best way to communicate with us in through [facility] My [facility]. Make sure your account is active.    - Once you start CPAP therapy, it is mandatory by insurance, to have a follow up appointment with us within 31-90 days of getting CPAP.     Patient Education     Sleep apnea in adults   The Basics   Written by the doctors and editors at [facility]   What is sleep apnea? -- Sleep apnea is a condition that makes you stop breathing for short periods while you are asleep. There are 2 types of sleep apnea. One is called \"obstructive sleep apnea.\" The other is called \"central sleep apnea.\"  In obstructive sleep apnea, you stop breathing because your throat narrows or closes (figure 1). In central sleep apnea, you stop breathing because your " "brain does not send the right signals to your muscles to make you breathe. When people talk about sleep apnea, they are usually referring to obstructive sleep apnea, which is what this article is about.  People with sleep apnea do not know that they stop breathing when they are asleep. But they do sometimes wake up startled or gasping for breath. They also often hear from loved ones that they snore.  What are the symptoms of sleep apnea? -- The main symptoms of sleep apnea are loud snoring, tiredness, and daytime sleepiness. Other symptoms can include:   Restless sleep   Waking up choking or gasping   Morning headaches, dry mouth, or sore throat   Waking up often to urinate   Waking up feeling unrested or groggy   Trouble thinking clearly or remembering things  Some people with sleep apnea don't have symptoms, or don't realize that they have them.  Should I see a doctor or nurse? -- Yes. If you think that you might have sleep apnea, see your doctor.  Is there a test for sleep apnea? -- Yes. First, your doctor or nurse will ask about your symptoms. If you have a bed partner, they might also ask that person if you snore or gasp in your sleep. If the doctor or nurse suspects that you have sleep apnea, they might send you for a \"sleep study.\"  Sleep studies can sometimes be done at home, but they are usually done in a sleep lab. For the study, you spend the night in the lab, and you are hooked up to different machines that monitor your heart rate, breathing, and other body functions. The results of the test tell your doctor or nurse if you have the disorder.  Is there anything I can do on my own to help my sleep apnea? -- Yes. Some things that might help:   Try to avoid sleeping on your back, if possible. This might help some people.   Lose weight, if you have excess body weight.   Get regular physical activity. This might help you lose weight. But even if it doesn't, being active is good for your health.   Avoid " "alcohol, especially in the evening. Alcohol can make sleep apnea worse.  How is sleep apnea treated? -- Treatment can include:   Weight loss - As mentioned above, weight loss can help if you have excess weight or obesity. But losing weight can be challenging, and it takes time to lose enough weight to help with your sleep apnea. Most people need other treatment while they work on losing weight.   CPAP - The most effective treatment for sleep apnea is a device that keeps your airway open while you sleep. Treatment with this device is called \"continuous positive airway pressure\" (\"CPAP\"). People getting CPAP wear a face mask at night that keeps them breathing (figure 2).  If your doctor or nurse recommends a CPAP machine, be patient about using it. The mask might seem uncomfortable to wear at first, and the machine might seem noisy, but using the machine can really help you. People with sleep apnea who use a CPAP machine feel more rested and generally feel better.  If CPAP does not work, your doctor might suggest other treatment. Options might include:   An oral device - This is a device that you wear in your mouth. It is called an \"oral appliance\" or \"mandibular advancement device.\" It helps keep your airway open while you sleep.   Hypoglossal nerve stimulation - This involves a procedure to implant a small device into your chest. The device has a wire that connects to the nerve under your tongue. While you are sleeping, it sends an electrical signal that causes the tongue to push forward. This helps open up your airway.   Surgery to widen your airway - This might involve removing your tonsils or other tissue that blocks the airway.  Is sleep apnea dangerous? -- It can be. Risks include:   Accidents - People with sleep apnea do not get good-quality sleep, so they are often tired and not alert. This puts them at risk for car accidents and other types of accidents.   Other health problems - Studies show that people " with sleep apnea are more likely than others to have high blood pressure, heart attacks, and other serious heart problems. Some people also have mood changes or depression.  In people with severe sleep apnea, getting treated (for example, with CPAP) can help lower these risks.  All topics are updated as new evidence becomes available and our peer review process is complete.  This topic retrieved from [employer] on: Feb 28, 2024.  Topic 92544 Version 18.0  Release: 32.2.4 - C32.58  © 2024 UpToDate, Inc. and/or its affiliates. All rights reserved.  figure 1: Airway in a person with sleep apnea     Normally, when a person sleeps, the airway remains open, and air can pass from the nose and mouth to the lungs. In a person with sleep apnea, parts of the throat and mouth drop into the airway and block off the flow of air. This can cause loud snoring and interrupt breathing for short periods.  Graphic 65223 Version 6.0  figure 2: Continuous positive airway pressure (CPAP) for sleep apnea     The CPAP mask gently blows air into your nose while you sleep. It puts just enough pressure on your airway to keep it from closing. The mask in this picture fits over just the nose. Other CPAP devices have masks that fit over the nose and mouth.  Graphic 67832 Version 5.0  Consumer Information Use and Disclaimer   Disclaimer: This generalized information is a limited summary of diagnosis, treatment, and/or medication information. It is not meant to be comprehensive and should be used as a tool to help the user understand and/or assess potential diagnostic and treatment options. It does NOT include all information about conditions, treatments, medications, side effects, or risks that may apply to a specific patient. It is not intended to be medical advice or a substitute for the medical advice, diagnosis, or treatment of a health care provider based on the health care provider's examination and assessment of a patient's specific and unique  circumstances. Patients must speak with a health care provider for complete information about their health, medical questions, and treatment options, including any risks or benefits regarding use of medications. This information does not endorse any treatments or medications as safe, effective, or approved for treating a specific patient. UpToDate, Inc. and its affiliates disclaim any warranty or liability relating to this information or the use thereof.The use of this information is governed by the Terms of Use, available at https://www.[employer].com/en/know/clinical-effectiveness-terms. 2024© UpToDate, Inc. and its affiliates and/or licensors. All rights reserved.  Copyright   © 2024 UpToDate, Inc. and/or its affiliates. All rights reserved.

## 2025-07-03 NOTE — ASSESSMENT & PLAN NOTE
Reviewed the association between sleep and gastroesophageal reflux disease. Encouraged patient to consume last large meal at least 3 hours before bedtime.  Reviewed optimal sleeping position to minimize acid reflux episodes.    Orders:    Home Sleep Study; Future

## 2025-07-03 NOTE — LETTER
July 3, 2025     MARINA Whitfield  [address] [facility] [address]    Patient: Grant Shannon   YOB: 1985   Date of Visit: 7/3/2025       MARINA Whitfield:    Thank you for referring Grant Shannon to me for evaluation. Below are my notes for this consultation.    If you have questions, please do not hesitate to call me. I look forward to following your patient along with you.         Sincerely,        Kiran Ashby MD        CC: No Recipients    Kiran Ashby MD  7/3/2025  4:07 PM  Sign when Signing Visit  Name: Grant Shannon      : 1985      MRN: [id number]  Encounter Provider: Kiran Ashby MD  Encounter Date: 7/3/2025   Encounter department: [facility]    :  Assessment & Plan  Snoring  Snoring / Concern for Obstructive Sleep Apnea - Discussed pathophysiology of ANTONIA, consequences of untreated ANTONIA and treatment options including PAP therapy, mandibular advancement device, positional therapy, and surgical referral. - Discussed risks of sleepy driving and mitigation strategies (napping). Patient agrees to not drive if tired or sleepy. - Advised avoidance of alcohol and centrally acting medications as these can worsen ANTONIA.  - Grant presents today for new patient evaluation of snoring, choking, gasping, witnessed apneas.  Their collective clinical signs and symptoms may be suggestive of undiagnosed sleep disordered breathing.  Home sleep study order has been placed today in the office.  - I have asked the patient to return to the office once sleep testing is completed to review study results and discuss treatment options.  Patient verbalized understanding and stated that they would do so.    Orders:  •  Home Sleep Study; Future    Suspected sleep apnea  As above.   Orders:  •  Ambulatory Referral to Sleep Medicine  •  Home Sleep Study; Future    BMI 38.0-38.9,adult  Obesity - We reviewed the association of  "obesity on obstructive sleep apnea and sleep disordered breathing. Encouraged patient to follow healthy diet, regular exercise regimen, and pursue weight loss to manage symptoms.     Orders:  •  Home Sleep Study; Future    Gastroesophageal reflux disease, unspecified whether esophagitis present  Reviewed the association between sleep and gastroesophageal reflux disease. Encouraged patient to consume last large meal at least 3 hours before bedtime.  Reviewed optimal sleeping position to minimize acid reflux episodes.    Orders:  •  Home Sleep Study; Future    Excessive daytime sleepiness  The differential diagnosis for excessive daytime sleepiness is broad.  It includes insufficient sleep, medical and psychiatric conditions, neurologic conditions, central disorders of hypersomnolence, or primary sleep disordered breathing.  The patient's symptoms of excessive daytime sleepiness may be secondary to undiagnosed sleep disordered breathing.  Sleep study has been ordered at today's visit.           History of Present Illness            Sitting and reading: Moderate chance of dozing  Watching TV: Moderate chance of dozing  Sitting, inactive in a public place (e.g. a theatre or a meeting): Moderate chance of dozing  As a passenger in a car for an hour without a break: High chance of dozing  Lying down to rest in the afternoon when circumstances permit: Moderate chance of dozing  Sitting and talking to someone: Slight chance of dozing  Sitting quietly after a lunch without alcohol: Moderate chance of dozing  In a car, while stopped for a few minutes in traffic: Would never doze  Total score: 14       Review of Systems  Pertinent positives/negatives included in HPI and also as noted:         Objective  /78   Pulse 96   Ht 5' 8\" (1.727 m)   Wt 115 kg (253 lb)   SpO2 97%   BMI 38.47 kg/m²     Neck Circumference: 17  [facility] New Patient Evaluation    Mr. Shannon is a 40 y.o. male with a PMH of elevated BMI, " GERD, who presents as a new patient for evaluation of Sleep Disordered Breathing.     I have reviewed the 12/4/24 cardiology office note with Ry Lewis DO.     I have reviewed the 4/10/25 family medicine office note with MARINA Whitfield.     History of Presenting Illness:    The patient snores. There are choking and gasping episodes. There are witnessed apneas. Typically 1-2 episode(s) of nocturia occur per night. The patient sleeps on his side. There are no reports of nocturnal behaviors.     The patient's Dewey sleepiness scale score is 14/24 (<=10 is normal). He has not been sleepy while driving. He has not had a fall-asleep motor vehicle accident. There are no reports of hypnagogic hallucinations, cataplexy or sleep paralysis.    In terms of the patient's sleep/wake cycle symptoms:  Bedtime: 9am   SOL: Brief < 30 Minutes   Nocturnal awakenings: 1-2x, Tossing and Turning, Nocturia  Wakeup time: 2:30pm   Naps: Occasional     Total sleep time estimate: Approximately 5-6 hours.     Has every other weekend off.     He has not tried any medications for poor sleep in the past.    His legs occasionally do bother him on trying to initiate sleep.    Past Medical History[1]     Past Surgical History[2]     Prior tonsillectomy.     Allergies[3]     Medications Ordered Prior to Encounter[4]      Family History: His family history includes Heart disease in his maternal grandmother; Liver disease in his mother; No Known Problems in his brother, father, and sister; Prostate cancer in his maternal grandfather.    Mother with history of snoring. Maternal grandfather with history of snoring.     Social History:   Job: [employer]  Caffeine: Denied - Previously was having 3-4 Cups of Coffee  Alcohol: Denied  Drugs: Denied  Tobacco: Chewing Tobacco  Vape: Denied    Patient's medications, allergies, past medical, surgical, social and family histories were reviewed in the electronic  "medical record and updated as appropriate.    Review of Systems: On review of systems, the patient reports: Occasional AM Headaches, regular nasal sinus congestion, does wake with dry mouth and dry throat in morning.    Vitals:    07/03/25 1535   BP: 118/78   Pulse: 96   SpO2: 97%       Physical Examination:  Gen: No acute distress, not visibly anxious, speaking comfortably  H&N: MM III; no retro/micrognathia; macroglossia; no visible thyromegaly  Neuro: Alert and oriented x3, interactive  Psych: Pleasant, normal affect  Skin: No visible rashes  Respiratory: No accessory muscle use, breathing comfortably  Cardiac: No visible edema of extremities  Abdomen: Soft, NT, nondistended  Musculoskeletal: Normal ROM Intact of Extremities    Study Results:  I reviewed the following labs:    No results found for: \"FERRITIN\"    Lab Results   Component Value Date    WBC 13.71 (H) 06/06/2025    HGB 14.5 06/06/2025    HCT 42.3 06/06/2025    MCV 81 (L) 06/06/2025     06/06/2025       This SmartLink has not been configured with any valid records.       Lab Results   Component Value Date    SODIUM 138 06/06/2025    K 3.3 (L) 06/06/2025     06/06/2025    CO2 24 06/06/2025    BUN 11 06/06/2025    CREATININE 1.00 06/06/2025    GLUC 93 06/06/2025    CALCIUM 9.1 06/06/2025       This SmartLink has not been configured with any valid records.       Lab Results   Component Value Date    [id number] 3.853 12/02/2024          Results/Data:  I have reviewed the results and report from the 12/2/24 chest x-ray.    Independent Findings Reviewed: No acute cardiopulmonary disease.    I have reviewed the results and report from the 12/2/2024 CTA chest abdomen pelvis.    Independent Findings Reviewed: No acute findings in the chest, abdomen, or pelvis.  Hepatomegaly with diffuse hepatic steatosis.    I answered the patient's questions to the best of my ability. We will continue with longitudinal follow-up for evaluation of sleep " "disordered breathing. Follow-up will be after sleep testing is completed.    Kiran Ashby MD  Sleep Medicine and Internal Medicine  [facility]  07/03/25      Portions of the record may have been created with voice recognition software.  Occasional wrong word or \"sound a like\" substitutions may have occurred due to the inherent limitations of voice recognition software.  Read the chart carefully and recognize, using context, where substitutions have occurred.            [1]   Past Medical History:  Diagnosis Date   • Diverticulitis    • Diverticulitis of colon 06/2024   • GERD (gastroesophageal reflux disease)    • Sepsis without acute organ dysfunction (HCC) 06/21/2024   [2]   Past Surgical History:  Procedure Laterality Date   • SHOULDER SURGERY Right    • TONSILLECTOMY     [3]   Allergies  Allergen Reactions   • Other Sneezing     Cats   • Pollen Extract Other (See Comments)     Unknown      [4]   Current Outpatient Medications on File Prior to Visit   Medication Sig Dispense Refill   • omeprazole (PriLOSEC) 20 mg delayed release capsule Take 1 capsule (20 mg total) by mouth daily 30 capsule 4   • [DISCONTINUED] valACYclovir (VALTREX) 1,000 mg tablet Take 1 tablet (1,000 mg total) by mouth 3 (three) times a day for 7 days 21 tablet 0     No current facility-administered medications on file prior to visit.     "

## 2025-07-03 NOTE — PROGRESS NOTES
Name: Grant Shannon      : 1985      MRN: [id number]  Encounter Provider: Kiran Ashby MD  Encounter Date: 7/3/2025   Encounter department: [facility]    :  Assessment & Plan  Snoring  Snoring / Concern for Obstructive Sleep Apnea - Discussed pathophysiology of ANTONIA, consequences of untreated ANTONIA and treatment options including PAP therapy, mandibular advancement device, positional therapy, and surgical referral. - Discussed risks of sleepy driving and mitigation strategies (napping). Patient agrees to not drive if tired or sleepy. - Advised avoidance of alcohol and centrally acting medications as these can worsen ANTONIA.  - Grant presents today for new patient evaluation of snoring, choking, gasping, witnessed apneas.  Their collective clinical signs and symptoms may be suggestive of undiagnosed sleep disordered breathing.  Home sleep study order has been placed today in the office.  - I have asked the patient to return to the office once sleep testing is completed to review study results and discuss treatment options.  Patient verbalized understanding and stated that they would do so.    Orders:    Home Sleep Study; Future    Suspected sleep apnea  As above.   Orders:    Ambulatory Referral to Sleep Medicine    Home Sleep Study; Future    BMI 38.0-38.9,adult  Obesity - We reviewed the association of obesity on obstructive sleep apnea and sleep disordered breathing. Encouraged patient to follow healthy diet, regular exercise regimen, and pursue weight loss to manage symptoms.     Orders:    Home Sleep Study; Future    Gastroesophageal reflux disease, unspecified whether esophagitis present  Reviewed the association between sleep and gastroesophageal reflux disease. Encouraged patient to consume last large meal at least 3 hours before bedtime.  Reviewed optimal sleeping position to minimize acid reflux episodes.    Orders:    Home Sleep Study; Future    Excessive daytime  "sleepiness  The differential diagnosis for excessive daytime sleepiness is broad.  It includes insufficient sleep, medical and psychiatric conditions, neurologic conditions, central disorders of hypersomnolence, or primary sleep disordered breathing.  The patient's symptoms of excessive daytime sleepiness may be secondary to undiagnosed sleep disordered breathing.  Sleep study has been ordered at today's visit.           History of Present Illness             Sitting and reading: Moderate chance of dozing  Watching TV: Moderate chance of dozing  Sitting, inactive in a public place (e.g. a theatre or a meeting): Moderate chance of dozing  As a passenger in a car for an hour without a break: High chance of dozing  Lying down to rest in the afternoon when circumstances permit: Moderate chance of dozing  Sitting and talking to someone: Slight chance of dozing  Sitting quietly after a lunch without alcohol: Moderate chance of dozing  In a car, while stopped for a few minutes in traffic: Would never doze  Total score: 14       Review of Systems  Pertinent positives/negatives included in HPI and also as noted:         Objective   /78   Pulse 96   Ht 5' 8\" (1.727 m)   Wt 115 kg (253 lb)   SpO2 97%   BMI 38.47 kg/m²     Neck Circumference: 17  [facility] New Patient Evaluation    Mr. Shannon is a 40 y.o. male with a PMH of elevated BMI, GERD, who presents as a new patient for evaluation of Sleep Disordered Breathing.     I have reviewed the 12/4/24 cardiology office note with Ry Lewis DO.     I have reviewed the 4/10/25 family medicine office note with MARINA Whitfield.     History of Presenting Illness:    The patient snores. There are choking and gasping episodes. There are witnessed apneas. Typically 1-2 episode(s) of nocturia occur per night. The patient sleeps on his side. There are no reports of nocturnal behaviors.     The patient's Sloan sleepiness scale score is 14/24 (<=10 " is normal). He has not been sleepy while driving. He has not had a fall-asleep motor vehicle accident. There are no reports of hypnagogic hallucinations, cataplexy or sleep paralysis.    In terms of the patient's sleep/wake cycle symptoms:  Bedtime: 9am   SOL: Brief < 30 Minutes   Nocturnal awakenings: 1-2x, Tossing and Turning, Nocturia  Wakeup time: 2:30pm   Naps: Occasional     Total sleep time estimate: Approximately 5-6 hours.     Has every other weekend off.     He has not tried any medications for poor sleep in the past.    His legs occasionally do bother him on trying to initiate sleep.    Past Medical History[1]     Past Surgical History[2]     Prior tonsillectomy.     Allergies[3]     Medications Ordered Prior to Encounter[4]      Family History: His family history includes Heart disease in his maternal grandmother; Liver disease in his mother; No Known Problems in his brother, father, and sister; Prostate cancer in his maternal grandfather.    Mother with history of snoring. Maternal grandfather with history of snoring.     Social History:   Job: [employer]  Caffeine: Denied - Previously was having 3-4 Cups of Coffee  Alcohol: Denied  Drugs: Denied  Tobacco: Chewing Tobacco  Vape: Denied    Patient's medications, allergies, past medical, surgical, social and family histories were reviewed in the electronic medical record and updated as appropriate.    Review of Systems: On review of systems, the patient reports: Occasional AM Headaches, regular nasal sinus congestion, does wake with dry mouth and dry throat in morning.    Vitals:    07/03/25 1535   BP: 118/78   Pulse: 96   SpO2: 97%       Physical Examination:  Gen: No acute distress, not visibly anxious, speaking comfortably  H&N: MM III; no retro/micrognathia; macroglossia; no visible thyromegaly  Neuro: Alert and oriented x3, interactive  Psych: Pleasant, normal affect  Skin: No visible rashes  Respiratory: No accessory muscle  "use, breathing comfortably  Cardiac: No visible edema of extremities  Abdomen: Soft, NT, nondistended  Musculoskeletal: Normal ROM Intact of Extremities    Study Results:  I reviewed the following labs:    No results found for: \"FERRITIN\"    Lab Results   Component Value Date    WBC 13.71 (H) 06/06/2025    HGB 14.5 06/06/2025    HCT 42.3 06/06/2025    MCV 81 (L) 06/06/2025     06/06/2025       This SmartLink has not been configured with any valid records.       Lab Results   Component Value Date    SODIUM 138 06/06/2025    K 3.3 (L) 06/06/2025     06/06/2025    CO2 24 06/06/2025    BUN 11 06/06/2025    CREATININE 1.00 06/06/2025    GLUC 93 06/06/2025    CALCIUM 9.1 06/06/2025       This SmartLink has not been configured with any valid records.       Lab Results   Component Value Date    HQK2YKNCMWOG 3.853 12/02/2024          Results/Data:  I have reviewed the results and report from the 12/2/24 chest x-ray.    Independent Findings Reviewed: No acute cardiopulmonary disease.    I have reviewed the results and report from the 12/2/2024 CTA chest abdomen pelvis.    Independent Findings Reviewed: No acute findings in the chest, abdomen, or pelvis.  Hepatomegaly with diffuse hepatic steatosis.    I answered the patient's questions to the best of my ability. We will continue with longitudinal follow-up for evaluation of sleep disordered breathing. Follow-up will be after sleep testing is completed.    Kiran Ashby MD  Sleep Medicine and Internal Medicine  [facility]  07/03/25      Portions of the record may have been created with voice recognition software.  Occasional wrong word or \"sound a like\" substitutions may have occurred due to the inherent limitations of voice recognition software.  Read the chart carefully and recognize, using context, where substitutions have occurred.            [1]   Past Medical History:  Diagnosis Date    Diverticulitis     Diverticulitis of colon " 06/2024    GERD (gastroesophageal reflux disease)     Sepsis without acute organ dysfunction (HCC) 06/21/2024   [2]   Past Surgical History:  Procedure Laterality Date    SHOULDER SURGERY Right     TONSILLECTOMY     [3]   Allergies  Allergen Reactions    Other Sneezing     Cats    Pollen Extract Other (See Comments)     Unknown      [4]   Current Outpatient Medications on File Prior to Visit   Medication Sig Dispense Refill    omeprazole (PriLOSEC) 20 mg delayed release capsule Take 1 capsule (20 mg total) by mouth daily 30 capsule 4    [DISCONTINUED] valACYclovir (VALTREX) 1,000 mg tablet Take 1 tablet (1,000 mg total) by mouth 3 (three) times a day for 7 days 21 tablet 0     No current facility-administered medications on file prior to visit.

## 2025-08-05 ENCOUNTER — APPOINTMENT (OUTPATIENT)
Dept: LAB | Facility: AMBULARY SURGERY CENTER | Age: 40
End: 2025-08-05
Payer: COMMERCIAL

## 2025-08-05 DIAGNOSIS — Z00.6 ENCOUNTER FOR EXAMINATION FOR NORMAL COMPARISON OR CONTROL IN CLINICAL RESEARCH PROGRAM: ICD-10-CM

## 2025-08-05 DIAGNOSIS — Z11.59 NEED FOR HEPATITIS C SCREENING TEST: ICD-10-CM

## 2025-08-05 DIAGNOSIS — Z12.5 SCREENING PSA (PROSTATE SPECIFIC ANTIGEN): ICD-10-CM

## 2025-08-05 DIAGNOSIS — Z13.6 SCREENING FOR CARDIOVASCULAR CONDITION: ICD-10-CM

## 2025-08-05 LAB
ALBUMIN SERPL BCG-MCNC: 4.3 G/DL (ref 3.5–5)
ALP SERPL-CCNC: 65 U/L (ref 34–104)
ALT SERPL W P-5'-P-CCNC: 33 U/L (ref 7–52)
ANION GAP SERPL CALCULATED.3IONS-SCNC: 7 MMOL/L (ref 4–13)
AST SERPL W P-5'-P-CCNC: 19 U/L (ref 13–39)
BILIRUB SERPL-MCNC: 0.44 MG/DL (ref 0.2–1)
BUN SERPL-MCNC: 9 MG/DL (ref 5–25)
CALCIUM SERPL-MCNC: 9.2 MG/DL (ref 8.4–10.2)
CHLORIDE SERPL-SCNC: 106 MMOL/L (ref 96–108)
CO2 SERPL-SCNC: 27 MMOL/L (ref 21–32)
CREAT SERPL-MCNC: 0.9 MG/DL (ref 0.6–1.3)
GFR SERPL CREATININE-BSD FRML MDRD: 106 ML/MIN/1.73SQ M
GLUCOSE SERPL-MCNC: 83 MG/DL (ref 65–140)
POTASSIUM SERPL-SCNC: 3.6 MMOL/L (ref 3.5–5.3)
PROT SERPL-MCNC: 7.8 G/DL (ref 6.4–8.4)
PSA SERPL-MCNC: 0.45 NG/ML (ref 0–4)
SODIUM SERPL-SCNC: 140 MMOL/L (ref 135–147)

## 2025-08-05 PROCEDURE — 36415 COLL VENOUS BLD VENIPUNCTURE: CPT

## 2025-08-05 PROCEDURE — 86803 HEPATITIS C AB TEST: CPT

## 2025-08-05 PROCEDURE — 83036 HEMOGLOBIN GLYCOSYLATED A1C: CPT

## 2025-08-05 PROCEDURE — 80053 COMPREHEN METABOLIC PANEL: CPT

## 2025-08-05 PROCEDURE — G0103 PSA SCREENING: HCPCS

## 2025-08-06 LAB
EST. AVERAGE GLUCOSE BLD GHB EST-MCNC: 114 MG/DL
HBA1C MFR BLD: 5.6 %
HCV AB SER QL: NORMAL

## 2025-08-17 LAB
APOB+LDLR+PCSK9 GENE MUT ANL BLD/T: NOT DETECTED
BRCA1+BRCA2 DEL+DUP + FULL MUT ANL BLD/T: NOT DETECTED
MLH1+MSH2+MSH6+PMS2 GN DEL+DUP+FUL M: NOT DETECTED